# Patient Record
Sex: FEMALE
[De-identification: names, ages, dates, MRNs, and addresses within clinical notes are randomized per-mention and may not be internally consistent; named-entity substitution may affect disease eponyms.]

---

## 2020-03-31 ENCOUNTER — HOSPITAL ENCOUNTER (EMERGENCY)
Dept: HOSPITAL 97 - ER | Age: 55
Discharge: HOME | End: 2020-03-31
Payer: SELF-PAY

## 2020-03-31 VITALS — DIASTOLIC BLOOD PRESSURE: 69 MMHG | SYSTOLIC BLOOD PRESSURE: 111 MMHG | OXYGEN SATURATION: 100 %

## 2020-03-31 VITALS — TEMPERATURE: 97.7 F

## 2020-03-31 DIAGNOSIS — K21.9: ICD-10-CM

## 2020-03-31 DIAGNOSIS — N10: Primary | ICD-10-CM

## 2020-03-31 LAB
BUN BLD-MCNC: 11 MG/DL (ref 7–18)
GLUCOSE SERPLBLD-MCNC: 108 MG/DL (ref 74–106)
HCT VFR BLD CALC: 31.6 % (ref 36–45)
LYMPHOCYTES # SPEC AUTO: 1.8 K/UL (ref 0.7–4.9)
PMV BLD: 8.6 FL (ref 7.6–11.3)
POTASSIUM SERPL-SCNC: 3.9 MMOL/L (ref 3.5–5.1)
RBC # BLD: 3.68 M/UL (ref 3.86–4.86)
UA COMPLETE W REFLEX CULTURE PNL UR: (no result)

## 2020-03-31 PROCEDURE — 86308 HETEROPHILE ANTIBODY SCREEN: CPT

## 2020-03-31 PROCEDURE — 80048 BASIC METABOLIC PNL TOTAL CA: CPT

## 2020-03-31 PROCEDURE — 87086 URINE CULTURE/COLONY COUNT: CPT

## 2020-03-31 PROCEDURE — 85025 COMPLETE CBC W/AUTO DIFF WBC: CPT

## 2020-03-31 PROCEDURE — 87088 URINE BACTERIA CULTURE: CPT

## 2020-03-31 PROCEDURE — 99284 EMERGENCY DEPT VISIT MOD MDM: CPT

## 2020-03-31 PROCEDURE — 81015 MICROSCOPIC EXAM OF URINE: CPT

## 2020-03-31 PROCEDURE — 36415 COLL VENOUS BLD VENIPUNCTURE: CPT

## 2020-03-31 PROCEDURE — 87804 INFLUENZA ASSAY W/OPTIC: CPT

## 2020-03-31 PROCEDURE — 81003 URINALYSIS AUTO W/O SCOPE: CPT

## 2020-03-31 PROCEDURE — 96365 THER/PROPH/DIAG IV INF INIT: CPT

## 2020-03-31 PROCEDURE — 74177 CT ABD & PELVIS W/CONTRAST: CPT

## 2020-03-31 NOTE — EDPHYS
Physician Documentation                                                                           

 [facility]                                                                 

Name: Lilliana Coombs                                                                          

Age: 55 yrs                                                                                       

Sex: Female                                                                                       

: 1965                                                                                   

MRN: [id number]                                                                                   

Arrival Date: 2020                                                                          

Time: 06:15                                                                                       

Account#: [id number]                                                                            

Bed 17                                                                                            

Private MD:                                                                                       

ED Physician Dominguez Soriano                                                                     

HPI:                                                                                              

                                                                                             

06:56 This 55 yrs old  Female presents to ER via Ambulatory with complaints of Fever, kb  

      Body Aches.                                                                                 

06:56 The patient or guardian reports cough, that is intermittent, described as mild, flu     kb  

      symptoms, low-grade fever, myalgias, no appetite. Onset: The symptoms/episode               

      began/occurred 3 week(s) ago. Severity of symptoms: At their worst the symptoms were        

      mild, moderate, in the emergency department the symptoms are unchanged. Modifying           

      factors: The symptoms are alleviated by nothing, the symptoms are aggravated by             

      nothing. Associated signs and symptoms: Pertinent positives: fever, Pertinent               

      negatives: chest pain, diarrhea, ear ache, nausea, rhinorrhea, sore throat, vomiting.       

      The patient has not experienced similar symptoms in the past. The patient has been          

      recently seen by a physician:. Pt reports she has had body aches, decreased appetite,       

      fever, slight cough (not out of normal for her), suprapubic pain for 3 weeks. Completed     

      a course of bactrim at the beginning for UTI, but never got better. Was seen by Dr Esteban and tested negative for flu and COVID, but still not feeling well. .              

                                                                                                  

OB/GYN:                                                                                           

06:53 LMP N/A - Hysterectomy                                                                  mg2 

                                                                                                  

Historical:                                                                                       

- Allergies:                                                                                      

06:40 No Known Allergies;                                                                     mg2 

- Home Meds:                                                                                      

06:40 Nexium Oral [Active];                                                                   mg2 

- PMHx:                                                                                           

06:40 GERD; uterine ca;                                                                       mg2 

- PSHx:                                                                                           

06:40 Cholecystectomy; Hysterectomy;                                                          mg2 

                                                                                                  

- Immunization history:: Flu vaccine is up to date.                                               

- Social history:: Smoking status: Patient denies any tobacco usage or history of.                

  Patient/guardian denies using alcohol, street drugs, IV drugs.                                  

                                                                                                  

                                                                                                  

ROS:                                                                                              

06:53 ENT: Negative for injury, pain, and discharge, Neck: Negative for injury, pain, and     kb  

      swelling, Cardiovascular: Negative for chest pain, palpitations, and edema, Back:           

      Negative for injury and pain, MS/Extremity: Negative for injury and deformity, Skin:        

      Negative for injury, rash, and discoloration, Neuro: Negative for headache, weakness,       

      numbness, tingling, and seizure.                                                            

06:53 Constitutional: Positive for body aches, chills, fatigue, fever, malaise, Negative for      

      poor PO intake, weight loss.                                                                

06:53 Respiratory: Positive for cough, Negative for dyspnea on exertion, hemoptysis,              

      orthopnea, pleurisy, shortness of breath, sputum production, wheezing.                      

06:53 Abdomen/GI: Positive for abdominal pain, Negative for nausea, vomiting, and diarrhea,       

      constipation, abdominal cramps, abdominal distension, anorexia.                             

                                                                                                  

Exam:                                                                                             

06:55 Constitutional:  This is a well developed, well nourished patient who is awake, alert,  kb  

      and in no acute distress. Head/Face:  Normocephalic, atraumatic. ENT:  Nares patent. No     

      nasal discharge, no septal abnormalities noted.  Tympanic membranes are normal and          

      external auditory canals are clear.  Oropharynx with no redness, swelling, or masses,       

      exudates, or evidence of obstruction, uvula midline.  Mucous membranes moist. Neck:         

      Trachea midline, no thyromegaly or masses palpated, and no cervical lymphadenopathy.        

      Supple, full range of motion without nuchal rigidity, or vertebral point tenderness.        

      No Meningismus. Chest/axilla:  Normal chest wall appearance and motion.  Nontender with     

      no deformity.  No lesions are appreciated. Cardiovascular:  Regular rate and rhythm         

      with a normal S1 and S2.  No gallops, murmurs, or rubs.  Normal PMI, no JVD.  No pulse      

      deficits. Respiratory:  Lungs have equal breath sounds bilaterally, clear to                

      auscultation and percussion.  No rales, rhonchi or wheezes noted.  No increased work of     

      breathing, no retractions or nasal flaring. Abdomen/GI:  Soft, non-tender, with normal      

      bowel sounds.  No distension or tympany.  No guarding or rebound.  No evidence of           

      tenderness throughout. Back:  No spinal tenderness.  No costovertebral tenderness.          

      Full range of motion. Skin:  Warm, dry with normal turgor.  Normal color with no            

      rashes, no lesions, and no evidence of cellulitis. MS/ Extremity:  Pulses equal, no         

      cyanosis.  Neurovascular intact.  Full, normal range of motion. Neuro:  Awake and           

      alert, GCS 15, oriented to person, place, time, and situation.  Cranial nerves II-XII       

      grossly intact.  Motor strength 5/5 in all extremities.  Sensory grossly intact.            

      Cerebellar exam normal.  Normal gait.                                                       

                                                                                                  

Vital Signs:                                                                                      

06:36  / 79; Pulse 94; Resp 18; Temp 97.7; Pulse Ox 100% on R/A; Weight 68.04 kg;       mg2 

      Height 5 ft. 6 in. (167.64 cm); Pain 7/10;                                                  

07:35 BP 95 / 62; Pulse 64; Resp 17; Pulse Ox 96% on R/A;                                     rb1 

08:35  / 69; Pulse 73; Resp 16; Pulse Ox 100% on R/A;                                   rb1 

06:36 Body Mass Index 24.21 (68.04 kg, 167.64 cm)                                             mg2 

                                                                                                  

MDM:                                                                                              

06:19 Patient medically screened.                                                             kb  

06:53 Data reviewed: vital signs, nurses notes. Data interpreted: Pulse oximetry: on room air kb  

      is 100 %. Interpretation: normal.                                                           

08:16 Counseling: I had a detailed discussion with the patient and/or guardian regarding: the kb  

      historical points, exam findings, and any diagnostic results supporting the                 

      discharge/admit diagnosis, lab results, radiology results, the need for outpatient          

      follow up, a family practitioner, to return to the emergency department if symptoms         

      worsen or persist or if there are any questions or concerns that arise at home.             

                                                                                                  

                                                                                             

06:32 Order name: Flu; Complete Time: 07:33                                                   kb  

                                                                                             

06:32 Order name: Basic Metabolic Panel; Complete Time: 07:33                                 kb  

                                                                                             

06:32 Order name: CBC with Diff; Complete Time: 07:25                                         kb  

                                                                                             

06:32 Order name: Mono Screen Profile; Complete Time: 07:28                                   kb  

                                                                                             

06:32 Order name: Urine Microscopic Only; Complete Time: 07:41                                kb  

                                                                                             

06:33 Order name: Urine Dipstick--Ancillary (enter results)                                   mw2 

                                                                                             

06:32 Order name: IV Saline Lock; Complete Time: 06:50                                        kb  

                                                                                             

06:32 Order name: Labs collected and sent; Complete Time: 06:50                               kb  

                                                                                             

07:34 Order name: CT Abd/Pelvis - IV Contrast Only; Complete Time: 08:15                      kb  

                                                                                             

07:41 Order name: Urine Culture                                                               EDMS

                                                                                                  

Administered Medications:                                                                         

08:16 CANCELLED (Duplicate Order): Rocephin 1 grams IV at calculated rate once; Given slow IV kb  

      push per pharmacy instructions                                                              

08:22 Drug: Rocephin 1 grams Route: IV; Rate: calculated rate; Site: left antecubital;        rb1 

08:46 Follow up: Response: No adverse reaction; IV Status: Completed infusion                 rb1 

                                                                                                  

                                                                                                  

Disposition:                                                                                      

                                                                                             

05:01 Co-signature as Attending Physician, Dominguez Soriano MD I agree with the assessment and tw4 

      plan of care.                                                                               

                                                                                                  

Disposition:                                                                                      

20 08:17 Discharged to Home. Impression: Acute tubulo-interstitial nephritis.               

- Condition is Stable.                                                                            

- Discharge Instructions: Pyelonephritis, Adult, Easy-to-Read.                                    

- Prescriptions for cefpodoxime 200 mg Oral Tablet - take 1 tablet by ORAL route every            

  12 hours for 10 days with food; 20 tablet.                                                      

- Medication Reconciliation Form, Thank You Letter, Antibiotic Education, Prescription            

  Opioid Use form.                                                                                

- Follow up: Emergency Department; When: As needed; Reason: Worsening of condition.               

  Follow up: Private Physician; When: 2 - 3 days; Reason: Recheck today's complaints,             

  Continuance of care, Re-evaluation by your physician.                                           

                                                                                                  

                                                                                                  

                                                                                                  

Signatures:                                                                                       

Dispatcher [employer]                           EDAmanda Valentine, FNP-C                 FNP-CkTiffany Smith, RN                     RN   rb1                                                  

Dominguez Soriano MD MD   tw4                                                  

Lewis Saha RN                    RN   mg2                                                  

                                                                                                  

Corrections: (The following items were deleted from the chart)                                    

                                                                                             

08:16 08:16 Rocephin 1 grams IV at calculated rate once; Given slow IV push per pharmacy      kb  

      instructions ordered. kb                                                                    

08:56 08:17 2020 08:17 Discharged to Home. Impression: Acute tubulo-interstitial        rb1 

      nephritis. Condition is Stable. Forms are Medication Reconciliation Form, Thank You         

      Letter, Antibiotic Education, Prescription Opioid Use. Follow up: Emergency Department;     

      When: As needed; Reason: Worsening of condition. Follow up: Private Physician; When: 2      

      - 3 days; Reason: Recheck today's complaints, Continuance of care, Re-evaluation by         

      your physician. kb                                                                          

                                                                                                  

**************************************************************************************************

## 2020-03-31 NOTE — ER
Nurse's Notes                                                                                     

 [facility]                                                                 

Name: Lilliana Coombs                                                                          

Age: 55 yrs                                                                                       

Sex: Female                                                                                       

: 1965                                                                                   

MRN: [id number]                                                                                   

Arrival Date: 2020                                                                          

Time: 06:15                                                                                       

Account#: [id number]                                                                            

Bed 17                                                                                            

Private MD:                                                                                       

Diagnosis: Acute tubulo-interstitial nephritis                                                    

                                                                                                  

Presentation:                                                                                     

                                                                                             

06:34 Chief complaint: Patient states: i think i have kidney infection. i have abdominal      mg2 

      pain, loss of appetite, nausea for 2 weeks. i was tested for covid 19 last thurs and it     

      came out negative. i was already treated with bactrim but im not improving. i also have     

      fever yesterday 100.7.                                                                      

06:34 Method Of Arrival: Ambulatory                                                           mg2 

06:36 Coronavirus screen: Patient denies a cough. Patient denies shortness of breath or       mg2 

      difficulty breathing. Patient reports a measured and/or subjective temperature greater      

      than 100.4F. Patient denies travel on a cruise ship or to a country the [facility] currently       

      lists as an affected area. Ebola Screen: No symptoms or risks identified at this time.      

      Initial Sepsis Screen: Does the patient meet any 2 criteria? No. Patient's initial          

      sepsis screen is negative. Does the patient have a suspected source of infection? No.       

      Patient's initial sepsis screen is negative. Risk Assessment: Do you want to hurt           

      yourself or someone else? Patient reports no desire to harm self or others.                 

06:36 Acuity: LAINE 3                                                                           mg2 

06:52 Onset of symptoms was 2020.                                                       mg2 

                                                                                                  

OB/GYN:                                                                                           

06:53 LMP N/A - Hysterectomy                                                                  mg2 

                                                                                                  

Historical:                                                                                       

- Allergies:                                                                                      

06:40 No Known Allergies;                                                                     mg2 

- Home Meds:                                                                                      

06:40 Nexium Oral [Active];                                                                   mg2 

- PMHx:                                                                                           

06:40 GERD; uterine ca;                                                                       mg2 

- PSHx:                                                                                           

06:40 Cholecystectomy; Hysterectomy;                                                          mg2 

                                                                                                  

- Immunization history:: Flu vaccine is up to date.                                               

- Social history:: Smoking status: Patient denies any tobacco usage or history of.                

  Patient/guardian denies using alcohol, street drugs, IV drugs.                                  

                                                                                                  

                                                                                                  

Screenin:52 Abuse screen: Denies threats or abuse. Denies injuries from another. Nutritional        mg2 

      screening: No deficits noted. Tuberculosis screening: No symptoms or risk factors           

      identified. Fall Risk IV access (20 points).                                                

                                                                                                  

Assessment:                                                                                       

06:50 General: Appears in no apparent distress. comfortable, Behavior is calm, appropriate    mg2 

      for age. Pain: Complains of pain in abdomen Pain currently is 7 out of 10 on a pain         

      scale. Quality of pain is described as aching, Pain began gradually, 2 weeks now.           

      Neuro: Level of Consciousness is awake, alert, obeys commands, Oriented to person,          

      place, time, situation. Cardiovascular: Capillary refill < 3 seconds Patient's skin is      

      warm and dry. Respiratory: Airway is patent Respiratory effort is even, unlabored,          

      Respiratory pattern is regular, symmetrical. GI: Reports lower abdominal pain, nausea,      

      loss of appetite. : No signs and/or symptoms were reported regarding the                  

      genitourinary system. EENT: No signs and/or symptoms were reported regarding the EENT       

      system. Derm: Skin is intact, is healthy with good turgor, Skin is pink, warm \T\ dry.      

      normal. Musculoskeletal: Circulation, motion, and sensation intact. Capillary refill <      

      3 seconds.                                                                                  

07:15 General: Appears in no apparent distress. comfortable, Behavior is calm, cooperative.   rb1 

      Pain: Complains of pain in abdomen Pain currently is 7 out of 10 on a pain scale.           

      Neuro: Level of Consciousness is awake, alert, obeys commands, Oriented to person,          

      place, time, situation. Cardiovascular: Capillary refill < 3 seconds is brisk in            

      bilateral fingers. Respiratory: Airway is patent Respiratory effort is even, unlabored,     

      Respiratory pattern is regular, symmetrical. Derm: Skin is pink, warm \T\ dry.              

08:15 Reassessment: Patient appears in no apparent distress at this time. Pt. is on her       rb1 

      telephone.                                                                                  

08:33 Reassessment: Discharge pending due to shot time.                                       rb1 

08:54 Reassessment: Patient appears in no apparent distress at this time. Patient and/or      rb1 

      family updated on plan of care and expected duration. Pain level reassessed. Patient is     

      alert, oriented x 3, equal unlabored respirations, skin warm/dry/pink. no adverse           

      reaction noted at this time.                                                                

                                                                                                  

Vital Signs:                                                                                      

06:36  / 79; Pulse 94; Resp 18; Temp 97.7; Pulse Ox 100% on R/A; Weight 68.04 kg;       mg2 

      Height 5 ft. 6 in. (167.64 cm); Pain 7/10;                                                  

07:35 BP 95 / 62; Pulse 64; Resp 17; Pulse Ox 96% on R/A;                                     rb1 

08:35  / 69; Pulse 73; Resp 16; Pulse Ox 100% on R/A;                                   rb1 

06:36 Body Mass Index 24.21 (68.04 kg, 167.64 cm)                                             mg2 

                                                                                                  

ED Course:                                                                                        

06:15 Patient arrived in ED.                                                                  ds1 

06:17 [facility]                                                        kb  

06:17 Dominguez Soriano MD is Attending Physician.                                            kb  

06:19 Lewis Saha, RN is Primary Nurse.                                                  mg2 

06:38 Triage completed.                                                                       mg2 

06:38 Arm band placed on.                                                                     mg2 

06:52 Patient has correct armband on for positive identification. Pulse ox on. NIBP on. Door  mg2 

      closed. Warm blanket given.                                                                 

06:52 No provider procedures requiring assistance completed. Inserted saline lock: 20 gauge   mg2 

      in left antecubital area, using aseptic technique. Blood collected.                         

07:54 CT Abd/Pelvis - IV Contrast Only In Process Unspecified.                                EDMS

08:55 IV discontinued, intact, bleeding controlled, No redness/swelling at site. Pressure     rb1 

      dressing applied.                                                                           

                                                                                                  

Administered Medications:                                                                         

08:16 CANCELLED (Duplicate Order): Rocephin 1 grams IV at calculated rate once; Given slow IV kb  

      push per pharmacy instructions                                                              

08:22 Drug: Rocephin 1 grams Route: IV; Rate: calculated rate; Site: left antecubital;        rb1 

08:46 Follow up: Response: No adverse reaction; IV Status: Completed infusion                 rb1 

                                                                                                  

                                                                                                  

Outcome:                                                                                          

08:17 Discharge ordered by MD.                                                                kb  

08:55 Discharged to home ambulatory.                                                          rb1 

08:55 Condition: stable                                                                           

08:55 Discharge instructions given to patient, Instructed on discharge instructions, follow       

      up and referral plans. medication usage, Demonstrated understanding of instructions,        

      follow-up care, medications, Prescriptions given X 1.                                       

08:56 Patient left the ED.                                                                    rb1 

                                                                                                  

Signatures:                                                                                       

Dispatcher [employer]                           EDMS                                                 

Amanda Beltrán FNP-C                 FNTHOMAS-Candie Benson                                ds1                                                  

Tiffany Crum, RN                     RN   rb1                                                  

Lewis Saha, SAE                    RN   mg2                                                  

                                                                                                  

Corrections: (The following items were deleted from the chart)                                    

06:39 06:34 Chief complaint: Patient states: i think i have kidney infection. i have          mg2 

      abdominal pain, loss of appetite, nausea for 2 weeks. i was tested for covid 19 last        

      thurs and it came out negative. i was already treated with bactrim but im not               

      improving. mg2                                                                              

06:39 06:36 Coronavirus screen: Patient denies a cough. Patient denies shortness of breath or mg2 

      difficulty breathing. Patient reports a measured and/or subjective temperature greater      

      than 100.4F. Patient denies travel on a cruise ship or to a country the [facility] currently       

      lists as an affected area. mg2                                                              

                                                                                                  

**************************************************************************************************

## 2020-03-31 NOTE — XMS REPORT
[facility]

 Created on:2020



Patient:Lilliana Coombs

Sex:Female

:1965

External Reference #:[id number]





Demographics







 Address  [address] [place] [address]

 

 Phone  [phone]

 

 Preferred Language  en

 

 Marital Status  Unknown

 

 Yarsanism Affiliation  Unknown

 

 Race  White

 

 Ethnic Group  Unknown









Author







 Organization  [employer]









Care Team Providers







 Name  Role  Phone

 

 Aspen Jurado  Provider Role  Unavailable









Allergies, Adverse Reactions, Alerts







 Substance  Reaction  Event Type

 

 Garlic  Info Not Available  Drug Allergy







Problems







 Problem Type  Condition  Code  Onset Dates  Condition Status

 

 Problem  Gastroesophageal reflux disease,  K21.9    Active



   esophagitis presence not specified      

 

 Problem  Diverticulosis  K57.90    Active

 

 Problem  Prediabetes  R73.03    Active

 

 Assessment  Viral upper respiratory illness  J06.9    Active

 

 Assessment  Cough  R05    Active

 

 Problem  Gastroparesis  K31.84    Active

 

 Problem  Gallstones  K80.20    Active

 

 Problem  Hypercholesterolemia  E78.00    Active

 

 Problem  Reflux  K21.9    Active

 

 Problem  Abdominal cramping  R10.9    Active

 

 Problem  Acute gastroenteritis  K52.9    Active

 

 Problem  Irritable bowel  K58.9    Active

 

 Problem  Cancer  C80.1    Active







Medications







 Medication  Code  Code  Instructions  Start  End Date  Status  Dosage



   System      Date      

 

 Bactrim DS  NDC  45345323267  800-160 MG  ,    Active  1 tablet



       Orally Twice a  2020      



       day for 10 days        

 

 Nexium  NDC  [address]-56   Orally as      Active  1 capsule



       directed prn        







Results

No Known Results



Summary Purpose

[employer] Submission

## 2020-03-31 NOTE — XMS REPORT
Patient Summary Document

 Created on:2020



Patient:PROSPER SAUNDERS

Sex:Female

:1965

External Reference #:[id number]





Demographics







 Address  [address] [address]

 

 Home Phone  [phone]

 

 Work Phone  [phone]

 

 Email Address  [email or web address]

 

 Preferred Language  Unknown

 

 Marital Status  Unknown

 

 Shinto Affiliation  Unknown

 

 Race  Unknown

 

 Additional Race(s)  Unavailable

 

 Ethnic Group  Unknown









Author







 Organization  [facility]

 

 Address  [address] [place] [address] [address]. [place] [address] [place] [address]

 

 Phone  [phone]









Care Team Providers







 Name  Role  Phone

 

 Unavailable  Unavailable  Unavailable









Problems

This patient has no known problems.



Allergies, Adverse Reactions, Alerts

This patient has no known allergies or adverse reactions.



Medications

This patient has no known medications.

## 2020-03-31 NOTE — XMS REPORT
[facility]

 Created on:2019



Patient:Lilliana Coombs

Sex:Female

:1965

External Reference #:[id number]





Demographics







 Address  [address] [place] [address]

 

 Phone  [phone]

 

 Preferred Language  en

 

 Marital Status  Unknown

 

 Voodoo Affiliation  Unknown

 

 Race  White

 

 Ethnic Group  Unknown









Author







 Organization  [employer]









Care Team Providers







 Name  Role  Phone

 

 Debi Esteban  Provider Role  Unavailable









Allergies, Adverse Reactions, Alerts







 Substance  Reaction  Event Type

 

 Garlic  Info Not Available  Drug Allergy







Problems







 Problem Type  Condition  Code  Onset Dates  Condition Status

 

 Problem  Gastroesophageal reflux disease,  K21.9    Active



   esophagitis presence not specified      

 

 Problem  Diverticulosis  K57.90    Active

 

 Problem  Prediabetes  R73.03    Active

 

 Assessment  Abdominal cramping  R10.9    Active

 

 Assessment  Acute gastroenteritis  K52.9    Active

 

 Problem  Gastroparesis  K31.84    Active

 

 Problem  Gallstones  K80.20    Active

 

 Problem  Hypercholesterolemia  E78.00    Active

 

 Problem  Reflux  K21.9    Active

 

 Problem  Abdominal cramping  R10.9    Active

 

 Problem  Acute gastroenteritis  K52.9    Active

 

 Problem  Irritable bowel  K58.9    Active

 

 Problem  Cancer  C80.1    Active







Medications







 Medication  Code  Code  Instructions  Start  End  Status  Dosage



   System      Date  Date    

 

 Dicyclomine HCl  NDC  93715954935  20 MG Orally  Aug 30,  Sept  Active  1 
tablet



       Four times a    09,    



       day as needed        



       for stomach        



       cramping/pain        

 

 Nexium  NDC  [address]-84   Orally as      Active  1 capsule



       directed prn        







Results

No Known Results



Summary Purpose

[employer] Submission

## 2020-03-31 NOTE — XMS REPORT
[facility]

 Created on:2020



Patient:Lilliana Coombs

Sex:Female

:1965

External Reference #:[id number]





Demographics







 Address  [address] [place] [address]

 

 Phone  [phone]

 

 Preferred Language  en

 

 Marital Status  Unknown

 

 Church Affiliation  Unknown

 

 Race  White

 

 Ethnic Group  Unknown









Author







 Organization  [employer]









Care Team Providers







 Name  Role  Phone

 

 Debi Esteban  Provider Role  Unavailable









Allergies

No Known Allergies



Problems







 Problem Type  Condition  Code  Onset Dates  Condition Status

 

 Problem  Gastroesophageal reflux disease,  K21.9    Active



   esophagitis presence not specified      

 

 Problem  Diverticulosis  K57.90    Active

 

 Problem  Prediabetes  R73.03    Active

 

 Problem  Gastroparesis  K31.84    Active

 

 Problem  Gallstones  K80.20    Active

 

 Problem  Hypercholesterolemia  E78.00    Active

 

 Problem  Reflux  K21.9    Active

 

 Problem  Abdominal cramping  R10.9    Active

 

 Problem  Acute gastroenteritis  K52.9    Active

 

 Problem  Irritable bowel  K58.9    Active

 

 Problem  Cancer  C80.1    Active







Medications

No Known Medications



Results

No Known Results



Summary Purpose

[employer] Submission

## 2020-03-31 NOTE — XMS REPORT
Continuity of Care Document (C-CDA R2.1) (Encounter date: 2020 03:00 PM)

 Created on:2020



Patient:Corin

Sex:Female

:1965

External Reference #:[id number]





Demographics







 Address  [address] [place] [address]

 

 Mobile Phone  [phone]

 

 Home Phone  [phone]

 

 Preferred Language  en

 

 Marital Status  Unknown

 

 Restorationism Affiliation  Unknown

 

 Race  White

 

 Ethnic Group   or 









Author







 Organization  [facility]

 

 Address  [address]



   [place] [address]

 

 Phone  [phone]









Support







 Name  Relationship  Address  Phone

 

 Kamaljit Miller  Unavailable  Unavailable  [phone]

 

 ......, ........  Unavailable  Unavailable  Unavailable









Care Team Providers







 Name  Role  Phone

 

 Anne Marie Hess  Unavailable  Unavailable









Allergies, Adverse Reactions, Alerts







 Substance  Reaction  Status

 

 No Known Allergies    Active







Medications







 Medication  Instructions  Dosage  Effective Dates  Status  Comments



       (start - stop)    

 

 mupirocin 2 %  apply by topical  0.00   -  Active  [Pat Resp=20



 topical ointment  route 3 times every        pct;]



   day a small amount        



   to the affected area        







Problems







 Condition  Effective Dates (start - stop)  Clinical Status  Comments









 No information







Procedures







 Procedure  Date

 

 Established Patient Office Visit-Level Four  







Results







 Test Name  Date and Time  Measure  Units  Reference Range  Abnormal Flag  
Status  Comments









 No information







Advance Directives







 Directive  Yes / No  Effective Date  File Name









 No information







Encounters







 Encounter  Practice  Location  Reason(s)  Diagnoses  Date  Provider  Providers



 Description      For Visit        Copied on



               Encounter

 

 Established  [facility]  TC  Ear  Body mass index  -  Paolo  Referring



 Patient  [facility]  [facility]  complaints  (BMI) 26.0-26.9,    Anne Marie.  
Provider:



 Office  Wellness,  Health &  (chief  adultOnychomycosisOt  0  [address]  Anne Marie



 Visit-Level  [address]  complaint)T  algiaEncounter for    Anuj Boone,



 Four  [address]    oe fungus  immunizationRash    [place]  [address]



   [place],    (Anuj Nova



   [place],    complaint)R      [address]



   [id number]    shruti (chief      [facility]    complaint)      [place], [place],  [address],



   tel:+          [phone]  [place]



   [id number]          .  [place], [place],



             tel:+  [phone].



             [id number]  tel:[phone]



               [phone]

 

   [place]  TC    Acute upper  Feb-2    



   Health &  [place]    respiratory  [address]    



   Wellness,  Health &    infection,  8    



   [address]    unspecified      



   [address],            



   [phone]            



   ,             



   tel:[phone]            



   [phone]            







Family History







 Family Member  Diagnosis  Age At Onset

 

   Family history of Cancer, prostate  

 

   Family history of Cancer, kidney  







Immunizations







 Vaccine  Date  Status  Comments

 

 Zoster recombinant subunit,    pending  Source: New Immunization Record



 preservative free      







Payers







 Payer name  Insurance type  Covered party ID  Authorization(s)









 No information







Social History







 Type  Description  Quantity  Date Captured  Comments

 

 Alcohol Use Details  beer & liquor  2 beers occasionally    

 

 Caffeine Use  coffee  2 cups per day    



 Details        

 

 Tobacco Use Status  Heavy cigarette      



   smoker (20-39      



   cigs/day)      

 

 Smoking Status  Heavy tobacco smoker      

 

 Smoking Tobacco Use  Cigarette: No Details Available  Cigarette: 1 Packs per 
day    



 Details        

 

 Birth Sex  Female      







Vital Signs







 Date /  Height  Weight  BMI  Pulse  Blood  Temperature  Respiratory  Body  
Head  BMI  Pulse  Inhaled



 Time:        Rate  Pressure    Rate  Surface  Circumference  percentile  Ox  Ox



                 Area        

 

   66.00  164.00  26.4  81  117/76  97.1 F  16 /min  1.86        in  lbs  7  /min  mm[Hg]      meter(2)        



 3:31      kg/m                  



 PM      eter                  



       (2)                  







Chief Complaint And Reason For Visit

*** Most recent encounter only, dated '2020 15:00'. ***Ear complaints (
chief complaint). Description: The symptoms began 1 day ago. The symptoms are 
reported as being moderate. She states the symptoms are acute. Pt states that 
in the car today she experienced a shooting pain starting from her R tragus to 
about an inch anterior to that. Pain is associated with swallowing. She denies 
any ear popping. She denies any prior episodes, but states that she clenches 
her teeth at night.Toe fungus (chief complaint). Description: The symptoms 
began 2 years ago. The symptoms are reportedas being moderate. The symptoms 
occur constantly. The location is bilateral feet, worse on R. She states the 
symptoms are chronic. Pt states she has had onychomycosis x 2 years. She has 
tried a prescription lotion, kerasal, and bleach water, and an additional 
prescription she got from her sister (cannot recall the name). She endorses 
some swelling over the great toe and intermittent blistering just proximal to 
the nail. She has been given a 5 day course of abx in the past for an infected 
blister. Another physician was going to start her on an oral medication but she 
has not obtained the LFTs yet.Rash (chief complaint). Description: The patient 
presents for Rash. The symptom(s) are described as moderate and occurs 
occasionally. Affected area(s) include left thigh. The symptoms are associated 
with stress. Associated symptoms include painful rash. Additional information: 
wants vaccinePt states she has had intermittent episodes of shingles, the first 
being 7 years ago. Outbreaks are associated with stress. The rash appears over 
the posterior aspect of her L thigh. The current outbreak began 5 days ago and 
is now scabbing over. She reports associated pain.



Reason For Referral







 Reason For Referral

 

 No information







Plan Of Treatment







 Date  Type  Action  Status

 

   Goal  Lifestyle education regarding diet  completed

 

   Referral   Ordered:  ordered



     Podiatry (related to Onychomycosis)  

 

   Referral   Ordered:  ordered



     Referrals: Podiatry. Evaluate and treat  

 

   Appointment  Lilliana Coombs  BOOKED

 

   Appointment  Lilliana Coombs  BOOKED

 

 Unknown  Immunization  Zoster recombinant subunit, preservative free  ordered







History Of Present Illness







 Encounter Date  Complaint  History Of Present Illness

 

   Toe fungus  The symptoms began 2 years ago. The symptoms are



     reported as being moderate. The symptoms occur



     constantly. The location is bilateral feet, worse on R.



     She states the symptoms are chronic. Pt states she has



     had onychomycosis x 2 years. She has tried a



     prescription lotion, kerasal, and bleach water, and an



     additional prescription she got from her sister (cannot



     recall the name). She endorses some swelling over the



     great toe and intermittent blistering just proximal to



     the nail. She has been given a 5 day course of abx in



     the past for an infected blister. Another physician was



     going to start her on an oral medication but she has



     not obtained the LFTs yet.

 

   Ear complaints  The symptoms began 1 day ago. The symptoms are 
reported



     as being moderate. She states the symptoms are acute.



     Pt states that in the car today she experienced a



     shooting pain starting from her R tragus to about an



     inch anterior to that. Pain is associated with



     swallowing. She denies any ear popping. She denies any



     prior episodes, but states that she clenches her teeth



     at night.

 

   Rash  The patient presents for Rash. The symptom(s) are



     described as moderate and occurs occasionally. Affected



     area(s) include left thigh. The symptoms are associated



     with stress. Associated symptoms include painful rash.



     Additional information: wants vaccinePt states she has



     had intermittent episodes of shingles, the first being



     7 years ago. Outbreaks are associated with stress. The



     rash appears over the posterior aspect of her L thigh.



     The current outbreak began 5 days ago and is now



     scabbing over. She reports associated pain.







Functional Status







 Date  Functional Assessment









 No information







Medications Administered







 Medication  Instructions  Dosage  Effective Dates (start - stop)  Status  
Comments









 No information







Instructions







 Date  Instruction  Additional Information

 

   Keep area clean and dry Rx for topical  Related to Rash



   mupirocin ointment prn  

 

   Shingrix ordered  Related to Encounter for



     immunization

 

   pt instructed to take OTC  Related to Otalgia



   antiinflammatories and RTC if the pain  



   worsens  

 

   compliant with LSMpt instructed to  Related to Body mass index (
BMI)



   reschedule WWE to update pap smear  26.0-26.9, adult

 

   pt referred to podiatry for management  Related to Onychomycosis



   of longstanding onychomycosisRx for  



   topical mupirocin ointment to prevent  



   secondary infection  

 

   Giving encouragement to exercise  Related to Body mass index (BMI)



     26.0-26.9, adult

 

   Lifestyle education regarding diet  Related to Body mass index (
BMI)



     26.0-26.9, adult

 

 Mar-  Pt instructed to make appt for  Related to Acute upper



   WWEDiscussed safe otc options for  respiratory infection,



   medication, caution with  unspecified



   sleepinessDiscussed w pt risks w  



   unnecessary abx, need to avoid  







Assessments







 Type  Assessment  Date

 

 assessment  Body mass index (BMI) 26.0-26.9, adult  

 

 assessment  Onychomycosis  

 

 impression  pt has tried many different topical medications without  2020



   success  

 

 assessment  Otalgia  

 

 assessment  Encounter for immunization  

 

 assessment  Rash  

 

 impression  PE unremarkable  

 

 impression  pt has healing rash on posterior leg she believes to be  2020



   shingles; PE does not reveal usual presentation for shingles  







Goals







 Health Concern  Goal  Type  Priority  Status  Date









 No information







Medical Equipment







 Description  Device  Universal Device Identifier  Effective Dates (start - stop
)  Status









 No information







Mental Status







 Date  Cognitive Assessment

 

   Orientation - Oriented to time, place, person, situation.







Health Concerns







 Observation  Date









 No information









 Concern  Status  Date









 No information

## 2020-03-31 NOTE — XMS REPORT
Continuity of Care Document (C-CDA R2.1) (Encounter date: 2020 03:52 PM)

 Created on:2020



Patient:Corin

Sex:Female

:1965

External Reference #:[id number]





Demographics







 Address  [address] [place] [address]

 

 Mobile Phone  [phone]

 

 Home Phone  [phone]

 

 Preferred Language  en

 

 Marital Status  Unknown

 

 Evangelical Affiliation  Unknown

 

 Race  White

 

 Ethnic Group   or 









Author







 Organization  [facility]

 

 Address  [address]



   [place] [address]

 

 Phone  [phone]









Support







 Name  Relationship  Address  Phone

 

 Kamaljit Miller  Unavailable  Unavailable  [phone]

 

 Anne Marie Boone  Unavailable  Unavailable  Unavailable









Care Team Providers







 Name  Role  Phone

 

 Anne Marie Hess  Unavailable  Unavailable









Allergies, Adverse Reactions, Alerts







 Substance  Reaction  Status

 

 No Known Allergies    Active







Medications







 Medication  Instructions  Dosage  Effective Dates  Status  Comments



       (start - stop)    

 

 mupirocin 2 %  apply by topical  0.00   -  Active  [Pat Resp=20



 topical ointment  route 3 times every        pct;]



   day a small amount        



   to the affected area        







Problems







 Condition  Effective Dates (start - stop)  Clinical Status  Comments









 No information







Procedures







 Procedure  Date









 No information







Results







 Test Name  Date and Time  Measure  Units  Reference Range  Abnormal Flag  
Status  Comments









 No information







Advance Directives







 Directive  Yes / No  Effective Date  File Name









 No information







Encounters







 Encounter  Practice  Location  Reason(s)  Diagnoses  Date  Provider  Providers



 Description      For Visit        Copied on



               Encounter

 

   [facility]      Mar-  Paolo  Referring



   Health &  [facility]        Anne Marie.  Provider:



   Wellness,  Health &      [address]  Anne Marie



   [address]        Jeremy Rizzo, [place]  [address]



   [place]



   [place],          [facility] C,  [place]



   [id number]          [facility] C,



   tel:+          [phone]  [place]



   [id number]          .  [place], [place],



             tel:+  [phone].



             [phone]  tel:[phone]



               [phone]

 

   [facility]    Body mass index (BMI)    Paolo  Referring



   [facility]  [facility]    26.0-26.9,    Anne Marie.  Provider:



   Wellness,  Health &    adultOnychomycosisOta  0  [address]  Anne Marie



   [address]    lgiaEncounter for    Anuj Boone



   [address] [facility]    [place]  [address]



   [place]



   [place],          [address],  [place]



   [id number]          [facility]



   , [place], [place],  [address],



   tel:+          [phone]  [place]



   [id number]          .  [place], [place],



             tel:+  [phone].



             [phone]  tel:[phone]



               [phone]

 

   [facility]  TC    Acute upper  Feb-2    



   Health &  [place]    respiratory  [address]    



   Wellness,  Health &    infection,  [address]    



   [address]    unspecified      



   [address], [place], [place],            



   [phone]            



   ,             



   tel:+            



   [phone]            







Family History







 Family Member  Diagnosis  Age At Onset

 

   Family history of Cancer, prostate  

 

   Family history of Cancer, kidney  







Immunizations







 Vaccine  Date  Status  Comments

 

 Zoster recombinant subunit,    pending  Source: New Immunization Record



 preservative free      







Payers







 Payer name  Insurance type  Covered party ID  Authorization(s)









 No information







Social History







 Type  Description  Quantity  Date Captured  Comments

 

 Birth Sex  Female      







Vital Signs







 Date /  Height  Weight  BMI  Pulse  Blood  Temperature  Respiratory  Body  
Head  BMI  Pulse  Inhaled



 Time:        Rate  Pressure    Rate  Surface  Circumference  percentile  Ox  Ox



                 Area        









 No information







Chief Complaint And Reason For Visit

No information



Reason For Referral







 Reason For Referral

 

 No information







Plan Of Treatment







 Date  Type  Action  Status

 

   Goal  Lifestyle education regarding diet  completed

 

   Referral   Ordered:  ordered



     Referrals: Podiatry. Evaluate and treat  

 

 Unknown  Immunization  Zoster recombinant subunit, preservative free  ordered







History Of Present Illness







 Encounter Date  Complaint  History Of Present Illness









 No information







Functional Status







 Date  Functional Assessment









 No information







Medications Administered







 Medication  Instructions  Dosage  Effective Dates (start - stop)  Status  
Comments









 No information







Instructions







 Date  Instruction  Additional Information

 

   Keep area clean and dry Rx for topical  Related to Rash



   mupirocin ointment prn  

 

   Shingrix ordered  Related to Encounter for



     immunization

 

   pt instructed to take OTC  Related to Otalgia



   antiinflammatories and RTC if the pain  



   worsens  

 

   compliant with LSMpt instructed to  Related to Body mass index (
BMI)



   reschedule WWE to update pap smear  26.0-26.9, adult

 

   pt referred to podiatry for management  Related to Onychomycosis



   of longstanding onychomycosisRx for  



   topical mupirocin ointment to prevent  



   secondary infection  

 

   Giving encouragement to exercise  Related to Body mass index (BMI)



     26.0-26.9, adult

 

   Lifestyle education regarding diet  Related to Body mass index (
BMI)



     26.0-26.9, adult

 

 Mar-  Pt instructed to make appt for  Related to Acute upper



   WWEDiscussed safe otc options for  respiratory infection,



   medication, caution with  unspecified



   sleepinessDiscussed w pt risks w  



   unnecessary abx, need to avoid  







Assessments







 Type  Assessment  Date









 No information







Goals







 Health Concern  Goal  Type  Priority  Status  Date









 No information







Medical Equipment







 Description  Device  Universal Device Identifier  Effective Dates (start - stop
)  Status









 No information







Mental Status







 Date  Cognitive Assessment









 No information







Health Concerns







 Observation  Date









 No information









 Concern  Status  Date









 No information

## 2020-03-31 NOTE — XMS REPORT
[facility]

 Created on:2020



Patient:Lilliana Coombs

Sex:Female

:1965

External Reference #:[id number]





Demographics







 Address  [address] [place] [address]

 

 Phone  [phone]

 

 Preferred Language  en

 

 Marital Status  Unknown

 

 Pentecostalism Affiliation  Unknown

 

 Race  White

 

 Ethnic Group  Unknown









Author







 Organization  [employer]









Care Team Providers







 Name  Role  Phone

 

 Debi Esteban  Provider Role  Unavailable









Allergies

No Known Allergies



Problems







 Problem Type  Condition  Code  Onset Dates  Condition Status

 

 Problem  Gastroesophageal reflux disease,  K21.9    Active



   esophagitis presence not specified      

 

 Problem  Diverticulosis  K57.90    Active

 

 Problem  Prediabetes  R73.03    Active

 

 Problem  Gastroparesis  K31.84    Active

 

 Problem  Gallstones  K80.20    Active

 

 Problem  Hypercholesterolemia  E78.00    Active

 

 Problem  Reflux  K21.9    Active

 

 Problem  Abdominal cramping  R10.9    Active

 

 Problem  Acute gastroenteritis  K52.9    Active

 

 Problem  Irritable bowel  K58.9    Active

 

 Problem  Cancer  C80.1    Active







Medications

No Known Medications



Results

No Known Results



Summary Purpose

[employer] Submission

## 2020-03-31 NOTE — XMS REPORT
[facility]

 Created on:2020



Patient:Lilliana Coombs

Sex:Female

:1965

External Reference #:[id number]





Demographics







 Address  [address] [place] [address]

 

 Phone  [phone]

 

 Preferred Language  en

 

 Marital Status  Unknown

 

 Worship Affiliation  Unknown

 

 Race  White

 

 Ethnic Group  Unknown









Author







 Organization  [employer]









Care Team Providers







 Name  Role  Phone

 

 Debi Esteban  Provider Role  Unavailable









Allergies

No Known Allergies



Problems







 Problem Type  Condition  Code  Onset Dates  Condition Status

 

 Problem  Gastroesophageal reflux disease,  K21.9    Active



   esophagitis presence not specified      

 

 Problem  Diverticulosis  K57.90    Active

 

 Problem  Prediabetes  R73.03    Active

 

 Problem  Gastroparesis  K31.84    Active

 

 Problem  Gallstones  K80.20    Active

 

 Problem  Hypercholesterolemia  E78.00    Active

 

 Problem  Reflux  K21.9    Active

 

 Problem  Abdominal cramping  R10.9    Active

 

 Problem  Acute gastroenteritis  K52.9    Active

 

 Problem  Irritable bowel  K58.9    Active

 

 Problem  Cancer  C80.1    Active







Medications

No Known Medications



Results

No Known Results



Summary Purpose

[employer] Submission

## 2020-03-31 NOTE — RAD REPORT
EXAM DESCRIPTION:  CT - Abdomen   Pelvis W Contrast - 3/31/2020 7:53 am

 

CLINICAL HISTORY:  Abdominal pain

 

COMPARISON:  none.

 

TECHNIQUE:  Computed axial tomography of the abdomen pelvis was obtained. 100 cc Isovue-300 was admin
istered intravenously. Oral contrast was not requested which limits evaluation of bowel.

 

All CT scans are performed using dose optimization technique as appropriate and may include automated
 exposure control or mA/KV adjustment according to patient size.

 

FINDINGS:  The liver, spleen, pancreas, adrenal and right kidney appear unremarkable.

 

4 centimeter low to intermediate density area present within the upper pole left kidney reaching the 
periphery. This likely represents pyelonephritis.

 

Cholecystectomy

 

Hysterectomy

 

There is no evidence of diverticulitis.

 

Small hiatal hernia

 

IMPRESSION:  Mild to moderate left pyelonephritis

## 2023-07-03 ENCOUNTER — HOSPITAL ENCOUNTER (EMERGENCY)
Dept: HOSPITAL 9 - MADERS | Age: 58
LOS: 1 days | Discharge: ANOTHER HOSPITAL | End: 2023-07-04
Payer: COMMERCIAL

## 2023-07-03 DIAGNOSIS — Z87.891: ICD-10-CM

## 2023-07-03 DIAGNOSIS — K56.699: Primary | ICD-10-CM

## 2023-07-03 LAB
ALBUMIN SERPL BCG-MCNC: 3.8 G/DL (ref 3.5–5)
ALP SERPL-CCNC: 76 U/L (ref 40–110)
ALT SERPL W P-5'-P-CCNC: 110 U/L (ref 8–55)
ANION GAP SERPL CALC-SCNC: 14 MMOL/L (ref 10–20)
AST SERPL-CCNC: 67 U/L (ref 5–34)
BACTERIA UR QL AUTO: (no result) HPF
BASOPHILS # BLD AUTO: 0.1 THOU/UL (ref 0–0.2)
BASOPHILS NFR BLD AUTO: 1.4 % (ref 0–1)
BILIRUB SERPL-MCNC: 0.3 MG/DL (ref 0.2–1.2)
BUN SERPL-MCNC: 6 MG/DL (ref 9.8–20.1)
CALCIUM SERPL-MCNC: 9.3 MG/DL (ref 7.8–10.44)
CAUTI INDICATIONS FOR CULTURE: (no result)
CHLORIDE SERPL-SCNC: 102 MMOL/L (ref 98–107)
CO2 SERPL-SCNC: 28 MMOL/L (ref 22–29)
CREAT CL PREDICTED SERPL C-G-VRATE: 0 ML/MIN (ref 70–130)
EOSINOPHIL # BLD AUTO: 0.1 THOU/UL (ref 0–0.7)
EOSINOPHIL NFR BLD AUTO: 1.5 % (ref 0–10)
GLOBULIN SER CALC-MCNC: 3.2 G/DL (ref 2.4–3.5)
GLUCOSE SERPL-MCNC: 96 MG/DL (ref 70–105)
HGB BLD-MCNC: 11.3 G/DL (ref 12–16)
LIPASE SERPL-CCNC: 7 U/L (ref 8–78)
LYMPHOCYTES # BLD AUTO: 1.6 THOU/UL (ref 1.2–3.4)
LYMPHOCYTES NFR BLD AUTO: 16.4 % (ref 21–51)
MCH RBC QN AUTO: 29.7 PG (ref 27–31)
MCV RBC AUTO: 92.2 FL (ref 78–98)
MONOCYTES # BLD AUTO: 1.1 THOU/UL (ref 0.11–0.59)
MONOCYTES NFR BLD AUTO: 11 % (ref 0–10)
NEUTROPHILS # BLD AUTO: 6.9 THOU/UL (ref 1.4–6.5)
NEUTROPHILS NFR BLD AUTO: 69.8 % (ref 42–75)
PLATELET # BLD AUTO: 332 10X3/UL (ref 130–400)
POTASSIUM SERPL-SCNC: 4 MMOL/L (ref 3.5–5.1)
RBC # BLD AUTO: 3.82 MILL/UL (ref 4.2–5.4)
RBC UR QL AUTO: (no result) HPF (ref 0–3)
SODIUM SERPL-SCNC: 140 MMOL/L (ref 136–145)
SP GR UR STRIP: 1.01 (ref 1–1.03)
WBC # BLD AUTO: 9.9 10X3/UL (ref 4.8–10.8)
WBC UR QL AUTO: (no result) HPF (ref 0–3)

## 2023-07-03 PROCEDURE — 83690 ASSAY OF LIPASE: CPT

## 2023-07-03 PROCEDURE — 36415 COLL VENOUS BLD VENIPUNCTURE: CPT

## 2023-07-03 PROCEDURE — 74022 RADEX COMPL AQT ABD SERIES: CPT

## 2023-07-03 PROCEDURE — 85025 COMPLETE CBC W/AUTO DIFF WBC: CPT

## 2023-07-03 PROCEDURE — 74176 CT ABD & PELVIS W/O CONTRAST: CPT

## 2023-07-03 PROCEDURE — 81001 URINALYSIS AUTO W/SCOPE: CPT

## 2023-07-03 PROCEDURE — 96361 HYDRATE IV INFUSION ADD-ON: CPT

## 2023-07-03 PROCEDURE — 80053 COMPREHEN METABOLIC PANEL: CPT

## 2023-07-03 PROCEDURE — 96374 THER/PROPH/DIAG INJ IV PUSH: CPT

## 2024-08-11 NOTE — XMS REPORT
Continuity of Care Document



                           Created on: 2024





LILLIANA SAUNDERS

External Reference #: [id number]

: 1965

Sex: Female



Demographics





                                        Address             [address] [place]  [address]

 

                                        Home Phone          [phone]

 

                                        Work Phone          [phone]

 

                                        Mobile Phone        [phone]

 

                                        Email Address       [email or web address]

 

                                        Preferred Language  English

 

                                        Marital Status      Unknown

 

                                        Hindu Affiliation Unknown

 

                                        Race                Unknown

 

                                        Additional Race(s)  Unavailable

White

 

                                        Ethnic Group        Unknown





Author





                                        Name                Unknown

 

                                        Address             [address]

[place]  [address]

 

                                        \[facility] [place] [facility]\""

[facility]

 

                                        Address             [address]

[address]

[place]  [address]

 

                                        Phone               [phone]





Support





                          Name         Relationship Address      Phone

 

                          Le Millershua CHILD        Unknown      [phone]

[phone]

 

                          Anne Marie Boone Caregiver    Unknown      Unavailable

 

                                1               KAMALJIT          [address]

[place]  [address]                      Unavailable

 

                                2               Personal Relationship [address]

[place]  [address]                      Unavailable

 

                                KAMALJIT MILLER SN              [address] [place]  [address]                     [phone]

 

                                1               KAMALJIT          [address]

[place]  [address]                     Unavailable

 

                                Unavailable     Personal Relationship [address]

[place]  [address]                      Unavailable

 

                          GAUDENCIO BARTON OTHERRELATIONSHIP Unknown      [phone]

 

                                [employer] [place]  [address]                         [phone]





Care Team Providers





                                Care Team Member Name Role            Phone

 

                                Segun France. Primary Care Physici

an Unavailable

 

                                Debi Lux Attending Clinician Unavaila

Nicole Urban Attending Clinician Unavailable

 

                                Faraz Garcia) Attending Clinician Unavaila

DEVORA Patton Attending Clinician UnaYVETTE Tang  Attending Clinician Unavailable

 

                                KEVEN XIE Attending Clinician Unavailab

sujatha

 

                                GC_BCSS_Jose_Lou Attending Clinician Unavaila

Sandie Amador   Attending Clinician Unavailable

 

                                GC_BVWC_Marcus Attending Clinician Unavailable

 

                                DIANE DORAN Attending Clinician Unavailabl

e

 

                                [facility]           Attending Clinician Unavailable

 

                                Candida SCHAEFER, Devora Hinojosa Attending Clinician [phone]

[phone]

 

                                Rayshawn Lira  Attending Clinician Unavailable

 

                                Anne Marie Boone Attending Clinician Unavailable

 

                                Nicole Goemz Admitting Clinician Unavailable

 

                                Faraz Garcia) Admitting Clinician Unavaila

libertad

 

                                GC_BCSS_Jose_Dan1 Admitting Clinician Unavaila

libertad

 

                                GC_BVWC_[facility]_J Admitting Clinician Unavailable







Payers





                    Payer Name Policy Type Policy Number Effective Date Expirati

on Date Source

 

                          BCBS OON     4            [id number] 2023 

00:00:00                                            

 

                                                    [place] 

[place]                         [id number]        [id number] 

00:00:00                                            

 

                          PHCS-IMAGINE 360 2            [id number]    2022 

00:00:00                                            

 

                                                    [place] [employer] 

- BLUECHOICE - 

OPEN ACCESS                             [id number]        2022 

00:00:00                                2022 

00:00:00                                

 

                          MULTIPLAN-GPA/PPO 2            [id number]    2022

 

00:00:00                                            







Problems





                                                    Condition 

Name                                    Condition 

Details                                 Condition 

Category                  Status                    Onset 

Date                                    Resolution 

Date                                    Last 

Treatment 

Date                                    Treating 

Clinician                 Comments                  Source

 

                                                    Abdominal 

pain                                    Abdominal 

Pain                Problem             Active               

00:00:

00                                                               [facility]

 

                                                    Vesicocoli

c fistula                               Vesicocoli

c Fistula           Problem             Active               

00:00:

00                                                               [facility]

 

                                                    Hyperchole

sterolemia                              Hyperchole

sterolemia Problem Active                                          [facility]

 

                                                    Irritable 

bowel                                   Irritable 

bowel   Problem Active                                          [facility]

 

            Cancer Cancer Problem Active                               [facility]

 

            Reflux Reflux Problem Active                               [facility]

 

                                                    Diverticul

osis                                    Diverticul

osis    Problem Active                                          [facility]

 

                                                    Prediabete

s                                       Prediabete

s       Problem Active                                          [facility]

 

                                                    Abdominal 

cramping                                Abdominal 

cramping Problem Active                                          [facility]

 

                                                    Acute 

gastroente

ritis                                   Acute 

gastroente

ritis   Problem Active                                          [facility]

 

                                                    Gastropare

sis                                     Gastropare

sis     Problem Active                                          [facility]

 

            Gallstones Gallstones Problem Active                               [facility]

[facility] 

[facility]







Allergies, Adverse Reactions, Alerts





                                                    Allergy 

Name                                    Allergy 

Type            Status          Severity        Reaction(s)     Onset 

Date                                    Inactive 

Date                                    Treating 

Clinician                 Comments                  Source

 

                                        Garlic              Adverse 

Reaction            Active                                  Info Not 

Available                                                        [facility]







Social History





                    Social Habit Start Date Stop Date Quantity  Comments  Source

 

                                                    Sex Assigned At 

Birth                                   1965 

00:00:00                                1965 

00:00:00            Female                                  [facility]







                          Smoking Status Start Date   Stop Date    Source

 

                          Unknown if ever smoked                           [facility]

on [facility]







Medications





                                                    Ordered 

Medication 

Name                                    Filled 

Medication 

Name                                    Start 

Date                                    Stop 

Date                                    Current 

Medication?                             Ordering 

Clinician       Indication      Dosage          Frequency       Signature 

(SIG)               Comments            Components          Source

 

                                Bactrim DS      Bactrim DS      

3-16 

00:00:

00                                      Yes                 [facility] 

[facility]                                   1 tablet                      [facility] 

[facility]

 

                                                    ciprofloxac

in 500 mg 

tablet TAKE 

1 TABLET BY 

MOUTH TWICE 

DAILY                                   ciprofloxac

in 500 mg 

tablet TAKE 

1 TABLET BY 

MOUTH TWICE 

DAILY                   No                                      ciprofloxa

parker 500 mg 

tablet 

TAKE 1 

TABLET BY 

MOUTH 

TWICE 

DAILY                                                       [facility]

 

                                                    diclofenac 

1 % topical 

gel                                     diclofenac 

1 % topical 

gel                     No                                      diclofenac 

1 % 

topical 

gel                                                         [facility]

 

                                                    diclofenac 

sodium 75 

mg 

tablet,kassy

yed release                             diclofenac 

sodium 75 

mg 

tablet,kassy

yed release                 No                                      diclofenac 

sodium 75 

mg 

tablet,del

ayed 

release                                                     [facility]

 

                                                    methylpredn

isolone 4 

mg tablets 

in a dose 

pack FOLLOW 

PACKAGE 

DIRECTIONS                              methylpredn

isolone 4 

mg tablets 

in a dose 

pack FOLLOW 

PACKAGE 

DIRECTIONS                 No                                      methylpred

nisolone 4 

mg tablets 

in a dose 

pack 

FOLLOW 

PACKAGE 

DIRECTIONS                                                  [facility]

 

                                                    ondansetron 

HCl 4 mg 

tablet                                  ondansetron 

HCl 4 mg 

tablet                  No                                      ondansetro

n HCl 4 mg 

tablet                                                      [facility]

 

                                                    phenazopyri

dine 200 mg 

tablet TAKE 

1 TABLET BY 

MOUTH EVERY 

8 HOURS AS 

NEEDED FOR 

URINARY 

PROBLEMS 

FOR 3 DAYS                              phenazopyri

dine 200 mg 

tablet TAKE 

1 TABLET BY 

MOUTH EVERY 

8 HOURS AS 

NEEDED FOR 

URINARY 

PROBLEMS 

FOR 3 DAYS                 No                                      phenazopyr

idine 200 

mg tablet 

TAKE 1 

TABLET BY 

MOUTH 

EVERY 8 

HOURS AS 

NEEDED FOR 

URINARY 

PROBLEMS 

FOR 3 DAYS                                                  [facility]

 

                                                    sulfamethox

azole 800 

mg-trimetho

prim 160 mg 

tablet TAKE 

1 TABLET BY 

MOUTH EVERY 

DAY BEFORE 

INTERCOURSE                             sulfamethox

azole 800 

mg-trimetho

prim 160 mg 

tablet TAKE 

1 TABLET BY 

MOUTH EVERY 

DAY BEFORE 

INTERCOURSE                 No                                      sulfametho

xazole 800 

mg-trimeth

oprim 160 

mg tablet 

TAKE 1 

TABLET BY 

MOUTH 

EVERY DAY 

BEFORE 

INTERCOURS

E                                                           [facility]

 

                                                    albuterol 

sulfate HFA 

90 

mcg/actuati

on aerosol 

inhaler 

INHALE 2 

PUFFS INTO 

THE LUNGS 

EVERY 4 

HOURS AS 

NEEDED FOR 

WHEEZING                                albuterol 

sulfate HFA 

90 

mcg/actuati

on aerosol 

inhaler 

INHALE 2 

PUFFS INTO 

THE LUNGS 

EVERY 4 

HOURS AS 

NEEDED FOR 

WHEEZING                 No                                      albuterol 

sulfate 

HFA 90 

mcg/actuat

ion 

aerosol 

inhaler 

INHALE 2 

PUFFS INTO 

THE LUNGS 

EVERY 4 

HOURS AS 

NEEDED FOR 

WHEEZING                                                    [facility]

 

                                                    amoxicillin 

500 

mg-potassiu

m 

clavulanate 

125 mg 

tablet TAKE 

1 TABLET BY 

MOUTH TWICE 

DAILY                                   amoxicillin 

500 

mg-potassiu

m 

clavulanate 

125 mg 

tablet TAKE 

1 TABLET BY 

MOUTH TWICE 

DAILY                   No                                      amoxicilli

n 500 

mg-potassi

um 

clavulanat

e 125 mg 

tablet 

TAKE 1 

TABLET BY 

MOUTH 

TWICE 

DAILY                                                       [facility]

 

                                                    amoxicillin 

875 

mg-potassiu

m 

clavulanate 

125 mg 

tablet TAKE 

1 TABLET BY 

MOUTH EVERY 

12 HOURS 

FOR 10 DAYS                             amoxicillin 

875 

mg-potassiu

m 

clavulanate 

125 mg 

tablet TAKE 

1 TABLET BY 

MOUTH EVERY 

12 HOURS 

FOR 10 DAYS                 No                                      amoxicilli

n 875 

mg-potassi

um 

clavulanat

e 125 mg 

tablet 

TAKE 1 

TABLET BY 

MOUTH 

EVERY 12 

HOURS FOR 

10 DAYS                                                     [facility]

 

                                                    azithromyci

n 250 mg 

tablet TAKE 

2 TABLETS 

BY MOUTH 

FOR 1 DAY 

THEN TAKE 1 

TABLET BY 

MOUTH DAILY 

FOR 4 DAYS 

THEREAFTER                              azithromyci

n 250 mg 

tablet TAKE 

2 TABLETS 

BY MOUTH 

FOR 1 DAY 

THEN TAKE 1 

TABLET BY 

MOUTH DAILY 

FOR 4 DAYS 

THEREAFTER                 No                                      azithromyc

in 250 mg 

tablet 

TAKE 2 

TABLETS BY 

MOUTH FOR 

1 DAY THEN 

TAKE 1 

TABLET BY 

MOUTH 

DAILY FOR 

4 DAYS 

THEREAFTER                                                  [facility]

 

                                                    benzonatate 

100 mg 

capsule 

TAKE 1 

CAPSULE BY 

MOUTH EVERY 

8 HOURS AS 

NEEDED FOR 

COUGH                                   benzonatate 

100 mg 

capsule 

TAKE 1 

CAPSULE BY 

MOUTH EVERY 

8 HOURS AS 

NEEDED FOR 

COUGH                   No                                      benzonatat

e 100 mg 

capsule 

TAKE 1 

CAPSULE BY 

MOUTH 

EVERY 8 

HOURS AS 

NEEDED FOR 

COUGH                                                       [facility]

 

                                                    Bin[employer] 

COVID-19 Ag 

Self Test 

kit TEST AS 

DIRECTED 

TODAY                                   [facility] 

COVID-19 Ag 

Self Test 

kit TEST AS 

DIRECTED 

TODAY                   No                                      [facility] 

COVID-19 

Ag Self 

Test kit 

TEST AS 

DIRECTED 

TODAY                                                       [facility]

 

                                                    cefdinir 

300 mg 

capsule 

TAKE 1 

CAPSULE BY 

MOUTH TWICE 

DAILY FOR 

10 DAYS                                 cefdinir 

300 mg 

capsule 

TAKE 1 

CAPSULE BY 

MOUTH TWICE 

DAILY FOR 

10 DAYS                 No                                      cefdinir 

300 mg 

capsule 

TAKE 1 

CAPSULE BY 

MOUTH 

TWICE 

DAILY FOR 

10 DAYS                                                     [facility]

 

                                                    ciprofloxac

in 250 mg 

tablet TAKE 

1 TABLET BY 

MOUTH EVERY 

12 HOURS 

FOR 7 DAYS                              ciprofloxac

in 250 mg 

tablet TAKE 

1 TABLET BY 

MOUTH EVERY 

12 HOURS 

FOR 7 DAYS                 No                                      ciprofloxa

parker 250 mg 

tablet 

TAKE 1 

TABLET BY 

MOUTH 

EVERY 12 

HOURS FOR 

7 DAYS                                                      [facility]

 

                                                    ciprofloxac

in 500 mg 

tablet TAKE 

1 TABLET BY 

MOUTH TWICE 

DAILY                                   ciprofloxac

in 500 mg 

tablet TAKE 

1 TABLET BY 

MOUTH TWICE 

DAILY                   No                                      ciprofloxa

parker 500 mg 

tablet 

TAKE 1 

TABLET BY 

MOUTH 

TWICE 

DAILY                                                       [facility]

 

                                                    diclofenac 

1 % topical 

gel                                     diclofenac 

1 % topical 

gel                     No                                      diclofenac 

1 % 

topical 

gel                                                         [facility] 

[facility]

 

                                                    diclofenac 

sodium 75 

mg 

tablet,kassy

yed release                             diclofenac 

sodium 75 

mg 

tablet,kassy

yed release                 No                                      diclofenac 

sodium 75 

mg 

tablet,del

ayed 

release                                                     [facility] 

[facility]

 

                                                    methylpredn

isolone 4 

mg tablets 

in a dose 

pack FOLLOW 

PACKAGE 

DIRECTIONS                              methylpredn

isolone 4 

mg tablets 

in a dose 

pack FOLLOW 

PACKAGE 

DIRECTIONS                 No                                      methylpred

nisolone 4 

mg tablets 

in a dose 

pack 

FOLLOW 

PACKAGE 

DIRECTIONS                                                  [facility]

 

                                                    ondansetron 

HCl 4 mg 

tablet                                  ondansetron 

HCl 4 mg 

tablet                  No                                      ondansetro

n HCl 4 mg 

tablet                                                      [facility]

 

                                                    phenazopyri

dine 200 mg 

tablet TAKE 

1 TABLET BY 

MOUTH EVERY 

8 HOURS AS 

NEEDED FOR 

URINARY 

PROBLEMS 

FOR 3 DAYS                              phenazopyri

dine 200 mg 

tablet TAKE 

1 TABLET BY 

MOUTH EVERY 

8 HOURS AS 

NEEDED FOR 

URINARY 

PROBLEMS 

FOR 3 DAYS                 No                                      phenazopyr

idine 200 

mg tablet 

TAKE 1 

TABLET BY 

MOUTH 

EVERY 8 

HOURS AS 

NEEDED FOR 

URINARY 

PROBLEMS 

FOR 3 DAYS                                                  [facility]

 

                                                    sulfamethox

azole 800 

mg-trimetho

prim 160 mg 

tablet TAKE 

1 TABLET BY 

MOUTH EVERY 

DAY BEFORE 

INTERCOURSE                             sulfamethox

azole 800 

mg-trimetho

prim 160 mg 

tablet TAKE 

1 TABLET BY 

MOUTH EVERY 

DAY BEFORE 

INTERCOURSE                 No                                      sulfametho

xazole 800 

mg-trimeth

oprim 160 

mg tablet 

TAKE 1 

TABLET BY 

MOUTH 

EVERY DAY 

BEFORE 

INTERCOURS

E                                                           [facility]

 

                                                    albuterol 

sulfate HFA 

90 

mcg/actuati

on aerosol 

inhaler 

INHALE 2 

PUFFS INTO 

THE LUNGS 

EVERY 4 

HOURS AS 

NEEDED FOR 

WHEEZING                                albuterol 

sulfate HFA 

90 

mcg/actuati

on aerosol 

inhaler 

INHALE 2 

PUFFS INTO 

THE LUNGS 

EVERY 4 

HOURS AS 

NEEDED FOR 

WHEEZING                 No                                      albuterol 

sulfate 

HFA 90 

mcg/actuat

ion 

aerosol 

inhaler 

INHALE 2 

PUFFS INTO 

THE LUNGS 

EVERY 4 

HOURS AS 

NEEDED FOR 

WHEEZING                                                    [facility]

 

                                                    amoxicillin 

500 

mg-potassiu

m 

clavulanate 

125 mg 

tablet TAKE 

1 TABLET BY 

MOUTH TWICE 

DAILY                                   amoxicillin 

500 

mg-potassiu

m 

clavulanate 

125 mg 

tablet TAKE 

1 TABLET BY 

MOUTH TWICE 

DAILY                   No                                      amoxicilli

n 500 

mg-potassi

um 

clavulanat

e 125 mg 

tablet 

TAKE 1 

TABLET BY 

MOUTH 

TWICE 

DAILY                                                       [facility]

 

                                                    amoxicillin 

875 

mg-potassiu

m 

clavulanate 

125 mg 

tablet TAKE 

1 TABLET BY 

MOUTH EVERY 

12 HOURS 

FOR 10 DAYS                             amoxicillin 

875 

mg-potassiu

m 

clavulanate 

125 mg 

tablet TAKE 

1 TABLET BY 

MOUTH EVERY 

12 HOURS 

FOR 10 DAYS                 No                                      amoxicilli

n 875 

mg-potassi

um 

clavulanat

e 125 mg 

tablet 

TAKE 1 

TABLET BY 

MOUTH 

EVERY 12 

HOURS FOR 

10 DAYS                                                     [facility]

 

                    Nexium    Nexium                        Yes       [facility] 

Jurado                                   1 capsule                     [facility]

 

                                                    azithromyci

n 250 mg 

tablet TAKE 

2 TABLETS 

BY MOUTH 

FOR 1 DAY 

THEN TAKE 1 

TABLET BY 

MOUTH DAILY 

FOR 4 DAYS 

THEREAFTER                              azithromyci

n 250 mg 

tablet TAKE 

2 TABLETS 

BY MOUTH 

FOR 1 DAY 

THEN TAKE 1 

TABLET BY 

MOUTH DAILY 

FOR 4 DAYS 

THEREAFTER                 No                                      azithromyc

in 250 mg 

tablet 

TAKE 2 

TABLETS BY 

MOUTH FOR 

1 DAY THEN 

TAKE 1 

TABLET BY 

MOUTH 

DAILY FOR 

4 DAYS 

THEREAFTER                                                  [facility]

 

                                                    benzonatate 

100 mg 

capsule 

TAKE 1 

CAPSULE BY 

MOUTH EVERY 

8 HOURS AS 

NEEDED FOR 

COUGH                                   benzonatate 

100 mg 

capsule 

TAKE 1 

CAPSULE BY 

MOUTH EVERY 

8 HOURS AS 

NEEDED FOR 

COUGH                   No                                      benzonatat

e 100 mg 

capsule 

TAKE 1 

CAPSULE BY 

MOUTH 

EVERY 8 

HOURS AS 

NEEDED FOR 

COUGH                                                       [facility]

 

                                                    [employer] 

COVID-19 Ag 

Self Test 

kit TEST AS 

DIRECTED 

TODAY                                   [employer] 

COVID-19 Ag 

Self Test 

kit TEST AS 

DIRECTED 

TODAY                   No                                      [employer] 

COVID-19 

Ag Self 

Test kit 

TEST AS 

DIRECTED 

TODAY                                                       [facility]

 

                                                    cefdinir 

300 mg 

capsule 

TAKE 1 

CAPSULE BY 

MOUTH TWICE 

DAILY FOR 

10 DAYS                                 cefdinir 

300 mg 

capsule 

TAKE 1 

CAPSULE BY 

MOUTH TWICE 

DAILY FOR 

10 DAYS                 No                                      cefdinir 

300 mg 

capsule 

TAKE 1 

CAPSULE BY 

MOUTH 

TWICE 

DAILY FOR 

10 DAYS                                                     [facility]

 

                                                    ciprofloxac

in 250 mg 

tablet TAKE 

1 TABLET BY 

MOUTH EVERY 

12 HOURS 

FOR 7 DAYS                              ciprofloxac

in 250 mg 

tablet TAKE 

1 TABLET BY 

MOUTH EVERY 

12 HOURS 

FOR 7 DAYS                 No                                      ciprofloxa

parker 250 mg 

tablet 

TAKE 1 

TABLET BY 

MOUTH 

EVERY 12 

HOURS FOR 

7 DAYS                                                      [facility]







Procedures





                          Procedure    Date / Time Performed Performing Clinicia

n Source

 

                          CT Abdomen Pelvis WO Con 2023 20:25:00          

    [facility]

 

                                                    XR Abdomen 2 View/1 View 

Cxr                 2023 17:06:00                     [facility]

 

                                                    Hysterectomy (Ovaries 

Remain)                                                     [facility]

 

                          Oral / Dental Surgery                           [facility]







Plan of Care





                      Planned Activity Planned Date Details    Comments   Source

 

                      Instructions                                  [facility]

al







Encounters





                                                    Start 

Date/Time                               End 

Date/Time                               Encounter 

Type                                    Admission 

Type                                    Attending 

Clinicians                              Care 

Facility                                Care 

Department                              Encounter 

ID                                      Source

 

                                                    2024 

08:06:00                        Outpatient                      [facility] Bergeron            [facility]              [id number]

[id number]                                   [facility]

 

                                                    2023 

10:44:00                        Inpatient       Urgent          Ene Gomezy                    [facility]                     Medical 

Service                                 [id number]

73                                      [facility]

 

                                                    2023 

11:56:00                        Inpatient       Elective        Faraz Garcia)               [facility]                     Surgical 

Service                                 [id number]

22                                      [facility]

 

                                                    2023 

10:19:00                        Outpatient                      Debi Bergeron            [facility]              [id number]

[id number]                                   [facility]

 

                                                    2022 

14:45:00                        Outpatient                      Debi Bergeron            [facility]              [facility]              [id number]

[id number]                                   [facility]

 

                                                    2022 

09:04:28                        Outpatient                      Debi Bergeron            [facility]              [facility]              [id number]

[id number]                                   [facility]

 

                                                    2022 

11:58:18                        Outpatient                      Debi Esteban               [facility]              [facility]              [id number]

[id number]                                   [facility]

 

                                                    2023 

16:00:00                                2023 

16:00:00            Outpatient                              DEVORA TIRADO          [id number]       [facility]

 

                                                    2023 

00:00:00                                2023 

00:00:00            Outpatient                              YVETTE WAGONER          [id number]       [facility]

 

                                                    2023-10-16 

08:00:00                                2023-10-16 

08:00:00            Outpatient                              KEVEN XIE          [id number]       [facility]

 

                                                    2023-07-10 

00:00:00                                2023-07-10 

00:00:00            Outpatient                              GC_BCSS_How

ell_Dan1            PRIV                PRIV                [id number]

[id number]                                 [facility]

 

                                                    2023-07-10 

00:00:00                                2023-07-10 

00:00:00            Outpatient                              GC_BCSS_How

ell_Dan1            PRIV                PRIV                [id number]

[id number]                                 [facility]

 

                                                    2023 

15:13:00                                2023 

05:20:00            Emergency           ER                  Sandie Soriano               [facility]              [id number]

[id number]                               [facility]

 

                                                    2023 

00:00:00                                2023 

00:00:00            Outpatient                              DEVORA TIRADO          [id number]       [facility]

 

                                                    2023 

00:00:00                                2023 

00:00:00            Outpatient                              YVETTE WAGONER          [id number]       [facility]

 

                                                    2023 

00:00:00                                2023 

00:00:00            Outpatient                              GC_BCSS_How

melchor_Dan1            PRIV                PRIV                [id number]

[id number]                                 [facility]

 

                                                    2023 

00:00:00                                2023 

00:00:00            Outpatient                              GC_BCSS_How

ell_Dan1            PRIV                PRIV                [id number]

[id number]                                 [facility] 

Medical

 

                                                    2023 

00:00:00                                2023 

00:00:00            Outpatient                              GC_BCSS_How

ell_Dan1            PRIV                PRIV                [id number]

[id number]                                 [facility]

 

                                                    2023 

00:00:00                                2023 

00:00:00            Outpatient                              GC_BCSS_How

ell_Dan1            PRIV                PRIV                [id number]

[id number]                                 [facility]

 

                                                    2023-05-15 

00:00:00                                2023-05-15 

00:00:00            Outpatient                              GC_BCSS_How

ell_Dan1            PRIV                PRIV                [id number]

[id number]                                 [facility]

 

                                                    2023 

00:00:00                                2023 

00:00:00            Outpatient                              GC_BCSS_How

ell_Dan1            PRIV                PRIV                [id number]

[id number]                                 [facility]

 

                                                    2023 

00:00:00                                2023 

00:00:00            Outpatient                              GC_BCSS_How

ell_Dan1            PRIV                PRIV                [id number]

[id number]                                 [facility]

 

                                                    2023 

00:00:00                                2023 

00:00:00                                Faraz Garcia MD: [address] [place] [address] 

[phone]

, Ph. 

[phone]                                        [facility] - 

GC_BCSS_11s

t Office                  [id number]                  [facility]

 

                                                    2023 

00:00:00                                2023 

00:00:00            Outpatient                              GC_BCSS_How

ell_Dan1            PRIV                PRIV                [id number]

[id number]                                 [facility]

 

                                                    2023 

00:00:00                                2023 

00:00:00            Outpatient                              GC_BCSS_How

ell_Dan1            PRIV                PRIV                [id number]

[id number]                                 [facility]

 

                                                    2023 

00:00:00                                2023 

00:00:00            Outpatient                              GC_BCSS_How

ell_Dan1            PRIV                PRIV                [id number]

[id number]                                 [facility]

 

                                                    2023 

00:00:00                                2023 

00:00:00            Outpatient                              GC_BCSS_How

ell_Dan1            PRIV                PRIV                [id number]

[id number]                                 [facility]

 

                                                    2023 

00:00:00                                2023 

00:00:00            Outpatient                              DEVORA TIRADO          [id number]       [facility]

 

                                                    2022 

00:00:00                                2022 

00:00:00            Outpatient                              GC_BVWC_Sou

th_J                PRIV                PRIV                [id number]

[id number]                                 [facility]

 

                                                    2022 

00:00:00                                2022 

00:00:00            Outpatient                              DEVORA TIRADO          [id number]       [facility]

 

                                                    2022-11-10 

00:00:00                                2022-11-10 

00:00:00            Outpatient                              YVETTE WAGONER          [id number]       [facility]

 

                                                    2022-10-10 

12:20:00                                2022-10-10 

12:20:00  Outpatient                     SIENNA    SIENNA    [id number] [facility]

 

                                                    2022-10-10 

00:00:00                                2022-10-10 

00:00:00            Outpatient                              DEVORA TIRADOAREN HSU          [id number]       [facility]

 

                                                    2022 

00:00:00                                2022 

00:00:00            Outpatient                              DEVORA TIRADO           SIENNA HSU          [id number]       [facility]

 

                                                    2022-09-15 

13:00:00                                2022-09-15 

13:00:00  Outpatient           DIANE DORAN    SIENNA    [id number] [facility]

 

                                                    2022-09-15 

11:25:00                                2022-09-15 

11:25:00  Outpatient           LABAnt HSU    SIENNA    [id number] [facility]

 

                                                    2022-09-15 

00:00:00                                2022-09-15 

00:00:00            Outpatient                              YVETTE WAGONER          SIENNA          [id number]       [facility]

 

                                                    2022-09-15 

00:00:00                                2022-09-15 

00:00:00            Outpatient                              YVETTE WAGONER          SIENNA          [id number]       [facility]

 

                                                    2022 

00:00:00                                2022 

00:00:00            Outpatient                              YVETTE WAGONER          SIENNA          [id number]       [facility]

 

                                                    2022 

00:00:00                                2022 

00:00:00            Outpatient                              DEVORA TIRADO           SIENNA HSU          [id number]       [facility]

 

                                                    2022 

10:05:00                                2022 

10:05:00  Outpatient           [facility]    SIENNA    [id number] [facility]

 

                                                    2022 

09:30:00                                2022 

09:45:00                                Office 

Visit                                               CandidaMckenziemik Beltrán                                 [id number]

[id number]

1.2.7.2.686

[id number]

0                         [id number]                 [facility]

 

                                                    2022 

00:00:00                                2022 

00:00:00            Outpatient                              CANDIDADEVORA RANGEL           SIENNA HSU          [id number]       [facility]

 

                                                    2022 

10:45:00                                2022 

11:00:00                                Office 

Visit                                               Devora Tirado                                 [id number]

[id number]

1.2.7.2.686

[id number]

0                         [id number]                 [facility]

 

                                                    2022-08-15 

16:15:00                                2022-08-15 

16:15:00            Outpatient                              DEVORA TIRADO           SIENNA HSU          [id number]       [facility]

 

                                                    2022-08-15 

00:00:00                                2022-08-15 

00:00:00            Outpatient                              DEVORA TIRADO           SIENNA HSU          [id number]       [facility]

 

                                                    2022 

00:00:00                                2022 

00:00:00            Outpatient                              DEVORA TIRADO           SIENNA HSU          [id number]       [facility]

 

                                                    2022 

00:00:00                                2022 

00:00:00            Outpatient                              DEVORA TIRADO           SIENNA HSU          [id number]       [facility]

 

                                                    2022 

14:50:00                                2022 

14:50:00  Outpatient           LAB90     SIENNA HSU    [id number] [facility]

 

                                                    2022 

14:00:00                                2022 

14:15:00                                Office 

Visit                                               Devora Tirado                                 [id number]

[id number]

1.2.7.2.686

[id number]

0                         [id number]                 [facility]

 

                                                    2021-10-18 

01:29:00                                2021-10-18 

01:29:00            Emergency           ER                  Rayshawn Lira               [facility]              [facility]              [id number]

[id number]                               [facility]

 

                                                    2020 

11:20:00                                2020 

11:20:00        Outpatient                                      [facility]                  [id number]                   [facility]

 

                                                    2020 

08:20:00                                2020 

08:20:00        Outpatient                                      [facility]                  [id number]                   [facility]

 

                                                    2020 

14:39:00                                2020 

14:39:00        Outpatient                                      [facility]

t [facility] 

Family 

Medicine                                [facility]                  [id number]                   [facility]

 

                                                    2020 

15:45:00                                2020 

15:45:00        Outpatient                                      [facility]

t [facility]                  [id number]                   [facility]

 

                                                    2020 

14:00:00                                2020 

14:00:00        Outpatient                                      [facility]

t [facility]                  [id number]                   [facility]

 

                                                    2020 

11:16:00                                2020 

11:16:00        Outpatient                                      [facility]

[facility]                  [id number]                   [facility]

 

                                                    2020 

15:52:00                                2020 

15:52:00            Outpatient                              [facility]         [facility]             [id number]          [facility]

 

                                                    2020 

15:00:00                                2020 

15:00:00            Outpatient                              [facility] Boone         [facility]             [id number]          [facility]

 

                                                    2019 

08:40:00                                2019 

08:40:00        Outpatient                                      [facility]                  [id number]                   [facility]







Results





                    Test Description Test Time Test Comments Results   Result Co

mments Source







                                        

 

                                        

 

                                        

 

                                        

 

                                        

 

                                        

 

                                        

 

                                        

 

                                        

 

                                        

 

                                        

 

                                        

 

                                        

 

                                        

 

                                        

 

                                        

 

                                        

 

                                        

 

                                        

 

                                        





HIndications to order a Urinalysis: AlteredMental st.lethargyUrine Source: Urine
[facility] [id number] 18:54:00* 



                      Test Item  Value      Reference Range Interpretation Comme

[facility]

 

                      Urinalysis (test code = UACRFLXNO) No                     

          





HIndications to order a Urinalysis: AlteredMental st.lethargyUrine Source: Urine
[facility] CatheterUrine [id number] 18:29:00* 



                      Test Item  Value      Reference Range Interpretation Comme

[facility]

 

                      Urine Color (test code = 5778-6) Yellow     Yellow        

        





[facility] [id number] 18:29:00* 



                      Test Item  Value      Reference Range Interpretation Comme

[facility]

 

                      Urine Clarity (test code = 71294-3) Hazy       Clear      

           





[facility] gravity of Urine by Test strip
2023 18:29:00* 



                      Test Item  Value      Reference Range Interpretation Comme

[facility]

 

                                                    Urine Specific Gravity (test

 code = 

5811-5)         1.015           1.005-1.030                     





[facility] pH measurement by automated test strip
2023 18:29:00* 



                      Test Item  Value      Reference Range Interpretation Comme

[facility]

 

                      Urine pH (test code = 15494-5) 7.0        5.0-9.0         

      





[facility] leukocyte esterase detection by automated 
test [id number] 18:29:00* 



                      Test Item  Value      Reference Range Interpretation Comme

[facility]

 

                                                    Urine Leukocyte Esterase (te

st code = 

94205-2)        Trace           Negative                        





[facility] Hospital[facility]it[facility] [Presence] in Urine by Test strip
2023 18:29:00* 



                      Test Item  Value      Reference Range Interpretation Comme

[facility]

 

                      Urine Nitrite (test code = 5802-4) Negative   Negative    

          





[facility] protein measurement by automated test 
strip (mass/volume)2023 18:29:00* 



                      Test Item  Value      Reference Range Interpretation Comme

[facility]

 

                                                    Urine Protein (test code = 

79542-9)        Negative mg/dL  Neg-Trace                       





[facility] glucose measurement by test strip 
(mass/volume)2023 18:29:00* 



                      Test Item  Value      Reference Range Interpretation Comme

[facility]

 

                                                    Urine Glucose (UA) (test cod

e 

= 5792-7)       Negative mg/dL  Negative                        





[facility] ketones measurement by automated test 
strip (mass/volume)2023 18:29:00* 



                      Test Item  Value      Reference Range Interpretation Comme

[facility]

 

                      Urine Ketones (test code = 05369-7) 80 mg/dL   Negative   

           





[facility] urobilinogen measurement (units/volume) by
 test rfvze2664-43-94 18:29:00* 



                      Test Item  Value      Reference Range Interpretation Comme

[facility]

 

                                                    Urine Urobilinogen (test cod

e = 

75305-7)        0.2 mg/dL       Less than 2                     





[facility] total bilirubin detection by automated 
test afivp3121-04-23 18:29:00* 



                      Test Item  Value      Reference Range Interpretation Comme

[facility]

 

                                                    Urine Bilirubin (test code =

 

87059-6)        Negative        Negative                        





[facility] Care[facility] hemoglobin detection by automated test 
uspgo5231-84-88 18:29:00* 



                      Test Item  Value      Reference Range Interpretation Comme

[facility]

 

                      Urine Blood (test code = 74294-1) Large      Negative     

         





[facility] HospitalU[facility] sediment erythrocyte count by microscopy 
(number/high power field)2023 18:29:00* 



                      Test Item  Value      Reference Range Interpretation Comme

[facility]

 

                      Urine RBC (test code = 84665-5) 21-50 HPF  0-3            

       





[facility] HospitalLeukocytes detection in urine sediment by light 
bkllkrdixh1646-70-08 18:29:00* 



                      Test Item  Value      Reference Range Interpretation Comme

[facility]

 

                      Urine WBC (test code = 38840-4) 0-3 HPF    0-3            

       





[facility] HospitalSquamous epithelial cells detection in urine 
sediment by light zrzhgkegez8001-29-29 18:29:00* 



                      Test Item  Value      Reference Range Interpretation Comme

[facility]

 

                                                    Urine Squamous Epithelial Ce

lls (test 

code = 18240-8) 0-3 HPF         0-3                             





[facility] HospitalBacteria detection in urine sediment by light 
oqihkoevoq8689-26-63 18:29:00* 



                      Test Item  Value      Reference Range Interpretation Comme

[facility]

 

                                                    Urine Bacteria (test code = 

59585-1)        Rare-Few HPF    None Seen                       





[facility] HospitalMucus detection in urine sediment by light 
igehnkpgvi1936-13-02 18:29:00* 



                      Test Item  Value      Reference Range Interpretation Comme

nts

 

                                                    Urine Mucus (test 

code = 8247-9)  Few LPF         See_Comment                     [Automated messa

ge] The 

system which generated 

this result transmitted 

reference range: <2+. The 

reference range was not 

used to interpret this 

result as normal/abnormal.





[facility] HospitalDo Not [id number] 18:29:00* 



                      Test Item  Value      Reference Range Interpretation Comme

nts

 

                                                    Urine Culture Reflexed (test

 code = 

LOINC)          No                                              





[facility] HospitalChemistry2023-07-03 18:13:00* 



                      Test Item  Value      Reference Range Interpretation Comme

nts

 

                                                    Chemistry (test code = 

NA-T)           140 mmol/L      136-145         N               

 

                                                    Chemistry (test code = 

K-T)            4.0 mmol/L      3.5-5.1         N               

 

                                                    Chemistry (test code = 

CL-T)           102 mmol/L                N               

 

                                                    Chemistry (test code = 

CO2-T)          28 mmol/L       22-29           N               

 

                                                    Chemistry (test code = 

ANGP)           14 mmol/L       10-20           N               

 

                                                    Chemistry (test code = 

BUN)            6 mg/dL         9.8-20.1        L               

 

                                                    Chemistry (test code = 

CREATT)         0.68 mg/dL      0.6-1.1         N               

 

                                                    Chemistry (test code = 

EGFRCR)         102                                             Reference Range 

for 

Estimated GFR: 

Greater than 90 

mL/min/1.73 

o0Ryyapnkf eGFR is 

based on the CKD-EPI 

202 equation 

thatdoes not use a 

race coefficient.

 

                                                    Chemistry (test code = 

GLU-T)          96 mg/dL                  N               

 

                                                    Chemistry (test code = 

CA-T)           9.3 mg/dL       7.8-10.44       N               

 

                                                    Chemistry (test code = 

TBILI-T)        0.3 mg/dL       0.2-1.2         N               

 

                                                    Chemistry (test code = 

TP)             7.0 g/dL        6.0-8.3         N               

 

                                                    Chemistry (test code = 

ALB)            3.8 g/dL        3.5-5.0         N               

 

                                                    Chemistry (test code = 

GLOB)           3.2 g/dL        2.4-3.5         N               

 

                                                    Chemistry (test code = 

AG)             1.2 g/dL        1.2-2.2         N               

 

                                                    Chemistry (test code = 

ALP)            76 U/L                    N               

 

                                                    Chemistry (test code = 

AST)            67 U/L          5-34            H               

 

                                                    Chemistry (test code = 

ALT)            110 U/L         8-55            H               





S PXlsjyrpnp2863-10-49 18:13:00* 



                      Test Item  Value      Reference Range Interpretation Comme

nts

 

                      Chemistry (test code = LIP) 7 U/L      8-78       L       

   





S ZVnhckxmcno8064-78-35 17:53:00* 



                      Test Item  Value      Reference Range Interpretation Comme

nts

 

                      Hematology (test code = WBCT) 9.9 10x3/uL 4.8-10.8   N    

      

 

                      Hematology (test code = RBCT) 3.82 mill/uL 4.20-5.40  L   

       

 

                      Hematology (test code = HGBT) 11.3 g/dL  12.0-16.0  L     

     

 

                      Hematology (test code = HCTT) 35.2 %     36.0-47.0  L     

     

 

                      Hematology (test code = MCV) 92.2 fl    78.0-98.0  N      

    

 

                      Hematology (test code = MCH) 29.7 pg    27.0-31.0  N      

    

 

                      Hematology (test code = MCHC) 32.2 g/dL  32.0-36.0  N     

     

 

                      Hematology (test code = RDW) 12.5 %     11.5-14.5  N      

    

 

                      Hematology (test code = PLTT) 332 10x3/uL 130-400    N    

      

 

                      Hematology (test code = MPV) 9.5 fL     7.4-10.4   N      

    

 

                      Hematology (test code = %NEUT) 69.8 %     42.0-75.0  N    

      

 

                      Hematology (test code = %LYMPH) 16.4 %     21.0-51.0  L   

       

 

                      Hematology (test code = %MONO) 11.0 %     0.0-10.0   H    

      

 

                      Hematology (test code = %EOS) 1.5 %      0.0-10.0   N     

     

 

                      Hematology (test code = %BASO) 1.4 %      0.0-1.0    H    

      

 

                      Hematology (test code = NEUT#) 6.9 thou/uL 1.40-6.50  H   

       

 

                      Hematology (test code = LYMPH#) 1.6 thou/uL 1.20-3.40  N  

        

 

                      Hematology (test code = MONO#) 1.1 thou/uL 0.11-0.59  H   

       

 

                      Hematology (test code = EOS#) 0.1 thou/uL 0.0-0.7    N    

      

 

                      Hematology (test code = BASO#) 0.1 thou/uL 0.0-0.2    N   

       





SSerum or plasma sodium measurement (moles/volume)2023 17:40:00* 



                      Test Item  Value      Reference Range Interpretation Comme

[facility]

 

                      Sodium Level (test code = 2951-2) 140 mmol/L 136-145      

         





[facility] 
(moles/volume)2023 17:40:00* 



                      Test Item  Value      Reference Range Interpretation Comme

nts

 

                                                    Potassium Level (test code =

 

2823-3)         4.0 mmol/L      3.5-5.1                         





[facility] or plasma chloride measurement 
(moles/volume)2023 17:40:00* 



                      Test Item  Value      Reference Range Interpretation Comme

nts

 

                                                    Chloride Level (test code = 

2075-0)         102 mmol/L                                





[facility] or plasma carbon dioxide, total 
measurement (moles/volume)2023 17:40:00* 



                      Test Item  Value      Reference Range Interpretation Comme

nts

 

                                                    Carbon Dioxide Level (test c

ode = 

-9)         28 mmol/L       22-29                           





[facility] or plasma anion [id number] 17:40:00* 



                      Test Item  Value      Reference Range Interpretation Comme

nts

 

                      Anion Gap (test code = 33037-3) 14 mmol/L  10-20          

       





[facility] or plasma urea nitrogen measurement 
(mass/volume)2023 17:40:00* 



                      Test Item  Value      Reference Range Interpretation Comme

nts

 

                                                    Blood Urea Nitrogen (test co

de = 

3094-0)         6 mg/dL         9.8-20.1                        





[facility] or plasma creatinine measurement 
(mass/volume)2023 17:40:00* 



                      Test Item  Value      Reference Range Interpretation Comme

nts

 

                      Creatinine (test code = 2160-0) 0.68 mg/dL 0.6-1.1        

       





[facility] CareGlomerular filtration rate/1.73 sq M.predicted 
[Volume Rate/Area] in Serum, Plasma [id number] 17:40:00* 



                      Test Item  Value      Reference Range Interpretation Comme

nts

 

                                                    Estimated GFR (CKD-EPI )

 (test code 

= 50255-9)      102                                             





[facility] CareGlucose [Mass/volume] in Serum or Plasma
2023 17:40:00* 



                      Test Item  Value      Reference Range Interpretation Comme

nts

 

                      Glucose Level (test code = 2345-7) 96 mg/dL         

          





[facility] or plasma calcium measurement 
(mass/volume)2023 17:40:00* 



                      Test Item  Value      Reference Range Interpretation Comme

nts

 

                      Calcium Level (test code = 72689-3) 9.3 mg/dL  7.8-10.44  

           





[facility] or plasma total bilirubin measurement 
(mass/volume)2023 17:40:00* 



                      Test Item  Value      Reference Range Interpretation Comme

nts

 

                                                    Total Bilirubin (test code =

 

1975-2)         0.3 mg/dL       0.2-1.2                         





[facility] or plasma protein measurement 
(mass/volume)2023 17:40:00* 



                      Test Item  Value      Reference Range Interpretation Comme

[facility]

 

                                                    Serum Total Protein (test co

de = 

2885-2)         7.0 g/dL        6.0-8.3                         





[facility] or plasma albumin measurement by 
bromocresol green (BCG) dye binding method ([id number] 17:40:00* 



                      Test Item  Value      Reference Range Interpretation Comme

nts

 

                      Albumin (test code = 20764-6) 3.8 g/dL   3.5-5.0          

     





[facility] CareGlobulin [Mass/volume] in Serum by calculation
2023 17:40:00* 



                      Test Item  Value      Reference Range Interpretation Comme

nts

 

                      Globulin (test code = 46398-0) 3.2 g/dL   2.4-3.5         

      





[facility] HospitalAlbumin/Globulin [Mass Ratio] in Serum or Plasma
2023 17:40:00* 



                      Test Item  Value      Reference Range Interpretation Comme

[facility]

 

                                                    Albumin/Globulin Ratio (test

 code = 

1759-0)         1.2 g/dL        1.2-2.2                         





[facility] HospitalAlkaline phosphatase [Enzymatic activity/volume]
in Serum or Qbccoy4929-81-28 17:40:00* 



                      Test Item  Value      Reference Range Interpretation Comme

nts

 

                                                    Alkaline Phosphatase (test c

ode = 

6768-6)         76 U/L                                    





[facility] or plasma aspartate aminotransferase 
measurement (enzymatic activity/volume)2023 17:40:00* 



                      Test Item  Value      Reference Range Interpretation Comme

nts

 

                                                    Aspartate Amino Transf (AST/

SGOT) 

(test code = 1920-8) 67 U/L          5-34                            





[facility] or plasma alanine aminotransferase 
measurement without P-5'-P (enzymatic sglcjn0558-89-63 17:40:00* 



                      Test Item  Value      Reference Range Interpretation Comme

nts

 

                                                    Alanine Aminotransferase (AL

T/SGPT) 

(test code = 1744-2) 110 U/L         8-55                            





[facility] or plasma lipase measurement (enzymatic 
activity/volume)2023 17:40:00* 



                      Test Item  Value      Reference Range Interpretation Comme

nts

 

                      Lipase (test code = 3040-3) 7 U/L      8-78               

   





[facility] HospitalLeukocytes [#/volume] in Blood by Automated 
pezhw2667-39-97 17:40:00* 



                      Test Item  Value      Reference Range Interpretation Comme

[facility]

 

                                                    White Blood Count (test code

 = 

6690-2)         9.9 10x3/uL     4.8-10.8                        





[facility] HospitalBlood erythrocytes automated count 
(number/volume)2023 17:40:00* 



                      Test Item  Value      Reference Range Interpretation Comme

[facility]

 

                                                    Red Blood Count (test code =

 

789-8)          3.82 mill/uL    4.20-5.40                       





[facility] HospitalBlood hemoglobin measurement (mass/volume)
2023 17:40:00* 



                      Test Item  Value      Reference Range Interpretation Comme

[facility]

 

                      Hemoglobin (test code = 718-7) 11.3 g/dL  12.0-16.0       

      





[facility] HospitalAutomated erythrocyte mean corpuscular volume
2023 17:40:00* 



                      Test Item  Value      Reference Range Interpretation Comme

[facility]

 

                                                    Mean Corpuscular Volume (arjun

t code = 

787-2)          92.2 fl         78.0-98.0                       





[facility] HospitalAutomated erythrocyte mean corpuscular 
hemoglobin (mass per erythrocyte)2023 17:40:00* 



                      Test Item  Value      Reference Range Interpretation Comme

[facility]

 

                                                    Mean Corpuscular Hemoglobin 

(test 

code = 785-6)   29.7 pg         27.0-31.0                       





[facility] HospitalAutomated erythrocyte mean corpuscular 
hemoglobin concentration measurement (mass/qeu8778-17-58 17:40:00* 



                      Test Item  Value      Reference Range Interpretation Comme

[facility]

 

                                                    Mean Corpuscular Hemoglobin 

Concent 

(test code = 786-4) 32.2 g/dL       32.0-36.0                       





[facility] HospitalAutomated erythrocyte distribution width ratio
2023 17:40:00* 



                      Test Item  Value      Reference Range Interpretation Comme

[facility]

 

                                                    Red Cell Distribution Width 

(test code 

= 788-0)        12.5 %          11.5-14.5                       





[facility] HospitalAutomated blood platelet count (count/volume)
2023 17:40:00* 



                      Test Item  Value      Reference Range Interpretation Comme

[facility]

 

                                                    Platelet Count (test code = 

777-3)          332 10x3/uL     130-400                         





[facility] HospitalAutomated blood platelet mean [id number] 
17:40:00* 



                      Test Item  Value      Reference Range Interpretation Comme

nts

 

                                                    Mean Platelet Volume (test c

ode = 

43153-0)        9.5 fL          7.4-10.4                        





[facility] HospitalAutomated blood neutrophils/100 leukocytes
2023 17:40:00* 



                      Test Item  Value      Reference Range Interpretation Comme

[facility]

 

                      Neutrophils % (test code = 770-8) 69.8 %     42.0-75.0    

         





[facility] HospitalLymphocytes/100 leukocytes in Blood by Automated
 bsspc8270-29-92 17:40:00* 



                      Test Item  Value      Reference Range Interpretation Comme

[facility]

 

                      Lymphocytes % (test code = 736-9) 16.4 %     21.0-51.0    

         





[facility] HospitalAutomated blood monocytes/100 leukocytes
2023 17:40:00* 



                      Test Item  Value      Reference Range Interpretation Comme

[facility]

 

                      Monocytes % (test code = 5905-5) 11.0 %     0.0-10.0      

        





[facility] HospitalAutomated blood eosinophils/100 leukocytes
2023 17:40:00* 



                      Test Item  Value      Reference Range Interpretation Comme

[facility]

 

                      Eosinophils % (test code = 713-8) 1.5 %      0.0-10.0     

         





[facility] HospitalAutomated blood basophils/100 leukocytes
2023 17:40:00* 



                      Test Item  Value      Reference Range Interpretation Comme

[facility]

 

                      Basophils % (test code = 706-2) 1.4 %      0.0-1.0        

       





[facility] HospitalBlood neutrophils automated count 
(number/volume)2023 17:40:00* 



                      Test Item  Value      Reference Range Interpretation Comme

nts

 

                      Neutrophils # (test code = 751-8) 6.9 thou/uL 1.40-6.50   

          





[facility] Throat HospitalLymphocytes [#/volume] in Blood by Automated 
gvhsf8998-01-27 17:40:00* 



                      Test Item  Value      Reference Range Interpretation Comme

nts

 

                      Lymphocytes # (test code = 731-0) 1.6 thou/uL 1.20-3.40   

          





[facility] HospitalBlMur[facility] monocytes automated count (number/volume)
2023 17:40:00* 



                      Test Item  Value      Reference Range Interpretation Comme

[facility]

 

                      Monocytes # (test code = 742-7) 1.1 thou/uL 0.11-0.59     

        





[facility] HospitalBlood eosinophils automated count (count/volume)
2023 17:40:00* 



                      Test Item  Value      Reference Range Interpretation Comme

[facility]

 

                      Eosinophils # (test code = 711-2) 0.1 thou/uL 0.0-0.7     

          





[facility] HospitalAutomated blood basophil count (count/volume)
2023 17:40:00* 



                      Test Item  Value      Reference Range Interpretation Comme

[facility]

 

                      Basophils # (test code = 704-7) 0.1 thou/uL 0.0-0.2       

        





[facility] CareGlucose Glucquwqnwh5732-50-15 20:10:00* 



                      Test Item  Value      Reference Range Interpretation Comme

[facility]

 

                                                    Glucose Fingerstick 

(test code = WGLUC) 94 mg/dL                                   Skyler Bojorquez RN or MD





Hemoglobin and Eneqzxhpuy0707-73-62 07:45:00* 



                      Test Item  Value      Reference Range Interpretation Comme

[facility]

 

                      Hemoglobin (test code = HGBT) 10.5 g/dL  12.2-14.8  L     

     

 

                      Hematocrit (test code = HCTT) 33.5 %     36.5-44.4  L     

     





Basic Metabolic [id number] 07:45:00* 



                      Test Item  Value      Reference Range Interpretation Comme

[facility]

 

                                                    SODIUM (test code = 

NA)             140.0 mmol/L    136.0-145.0     N               

 

                                                    Potassium,K (test 

code = K)       4.3 mmol/L      3.0-5.1         N               

 

                                                    Chloride (test code 

= CL)           108 mmol/L                H               

 

                                                    Carbon Dioxide (test 

code = CO2)     26 mmol/L       20-31           N               

 

                                                    Anion Gap (test code 

= GAP)          6 mmol/L        5-15            N               

 

                                                    Blood Urea Nitrogen 

(test code = BUN) 10 mg/dL        9-23            N               

 

                                                    Creatinine (test 

code = CREATT)  0.72 mg/dL      0.55-1.02       N               

 

                                                    Creatinine Clr Calc 

Pharmacy (test code 

= CRCLPHA)      80.70 mL/min                                    

 

                                                    Estimated Glomerular 

Filt Rate (test code 

= EGFR.XX)      97              See_Comment                     Reported eGFR is

 

based on the CKD-EPI 

 equation 

thatdoes not use a 

race coefficient. 

Additional 

information canbe 

found 

[id number]

[email or web address] 

([email or web address]) 

[Automated message] 

The system which 

generated this result 

transmitted reference 

range: >=90 

ml/min/1.73m2. The 

reference range was 

not used to interpret 

this result as 

normal/abnormal.

 

                                                    BUN/Creatinine Ratio 

(test code = 

BCRATIO)        14 ratio        10-20           N               

 

                                                    Glucose (test code = 

GLU)            87 mg/dL                  N               

 

                                                    Osmolality,Calculate

d (test code = 

OSMOC)          287.5                                           

 

                                                    Calcium (test code = 

CA)             8.6 mg/dL       8.3-10.6        N               





Glucose [id number] 21:47:00* 



                      Test Item  Value      Reference Range Interpretation Comme

nts

 

                                                    Glucose Fingerstick 

(test code = WGLUC) 182 mg/dL                                  Skyler Bojorquez RN or MD





XR voiding cystourethrogrm (p)[place] [id number]
[place] [address] [phone]  Patient Name: 
Lilliana Saunders Medical Record#: [id number] Address: [address] [place] [address] 
Account#: [id number] City/State/Zip: [place] [address] Attending Dr: Nicole Gomez MD Phone: [phone]: [employer] BLUE ADVANTAGE EXC HANGE 
/Age/Sex: 1965/58/F Self Pay Admit/Reg Date: 23 Ordering Dr: 
Faraz Garcia MD [facility] P[facility]: Segun Crawford NP  Date of 
Service: 23 Order (s): XR voiding cystourethrogrm (p)  CPT Code: 73913 
Report Number: [id number] Reason for Exam: h/o colovessicle fistula Clinical 
history: Colovesical fistula. Location: R 16. FINDINGS: Following administration
of the risks, benefits, and alternatives to the procedure, the patient gave oral
and written consent. Then, using sterile technique, a total of 150 mL contrast 
was instilled into the bladder under fluoroscopy. The bladder demonstrates a 
normal configuration. No vesicoureteral reflux is noted during the course of 
this examination. There is no evidence of fistula. There is complete emptying of
the bladder on the postvoid image. A total of 1.13 minutes fluoroscopy time is 
utilized. Reference air kerma is 39.1 mGy. IMPRESSION: 1. No abnormalities are 
identified. Electronically signed by: Yvette Alonzo MD 2023 3:12 PM CDT 
Workstation: [id number]  Dictated By: Yvette Alonzo MD 23 142 Signed 
By: Yvette Alonzo MD 23 1514  TD/TT: 23 Tech: [id number] cc: 
[id number]; [id number]* Faraz Garcia MD; Segun Crawford. NlXR small bowel 
[facility] [address] [place] [address] [phone] Patient Name: Lilliana Saunders Medical 
Record#: [id number] Address: [address] [place] [address] Account#: [id number] 
City/State/Zip: [place] [address] Attending Dr: Nicole Gomez MD Phone: 
[phone]: [employer] BLUE ADVANTAGE EXC HANGE /Age/Sex: 
1965/58/F Self Pay  Admit/Reg Date: 23 Ordering Dr: Faraz Garcia MD Location: [facility]E  PCP: Segun Crawford NP Date of Service: 23
 Order (s): XR small bowel follow thru (SB CPT Code: 02629 Report Number: 
[id number] Reason for Exam: abdominal distention EXAMINATION: XR small bowel 
follow thru (SB CLINICAL INDICATION: Female, 58 years old with abdominal 
distention TECHNIQUE:  KUB is performed. Patient was subsequently 
administered oral contrast, serial KUB examinations are performed until contrast
reaches the colon. Spot images are obtained as needed.  COMPARISON: None TOTAL 
FLUOROSCOPY TIME: 0 secondsTOTAL NUMBER OF IMAGES: 7 FINDINGS:  KUB 
demonstrates mild gaseous distention of the stomach and small bowel. Oral 
contrast was administered, opacifying the stomach. The duodenal sweep is unremar
kable. There is prominence of thejejunum with mild fold thickening. Distal small
bowel is visualized and appears to be of normal caliber, slightly featureless. 
The colon is visualized at 5 hours, slightly delayed bowel transit. IMPRESSION: 
Colon is visualized at 5 hours suggesting that there is slight delay in transit 
time. Proximal jejunum is distended, mild wall thickening. Electronically signed
by: Vinh Nair MD 2023 6:10 PM CDT Workstation: [id number]  Dictated By:
Vinh Nair MD 23 Signed By: Vinh Nair MD 23  
TD/TT: 23 Tech: PTN01 cc: [id number]; [id number]* Faraz Garcia MD; 
Segun Crawford. NlCT Abdomen Pelvis WO Con
************************************************************[facility] : 1965 Sex: 
F************************************************************[facility] Pt Name: LILLIANA SAUNDERS [address] Phys: [place] [address] : 1965 Age: 58 SEX:F [phone]  Exam Date:
23 Status: REG ER Acct: [id number] Loc: [facility] Pt Unit #: [id number] 
Report #: [id number] CC: Sandie Lucio MD PULSECHECKSJX:[id number] CAT SCAN 
REPORT Report Status: Signed Order # Category/Exam [id number] CT/CT Abdomen 
Pelvis WO Con ([id number]): . Results CT Abdomen Pelvis WO Con: 7/3/2023 8:35 PM
HISTORY: Small bowel obstruction. COMPARISON: None. TECHNIQUE: Multiple 
contiguous axial images were obtained and a CT of the abdomen and pelvis without
IV contrast. Coronal and sagittal reformats were performed. FINDINGS: This 
examination is limited for the evaluation of solid organs and vascular structure
s due to the lack of intravenous contrast. Lower Chest: Bibasilar subsegmental 
atelectasis and small bilateral effusions. Abdomen: Liver: within normal limits.
Gallbladder: Surgically absent Bile Ducts: Normal caliber.  Pancreas: within 
normal limits. Spleen: within normal limits. Adrenals: within normal limits. 
Kidneys: within normal limits. Pelvis: Reproductive Organs: No pelvic masses. 
Ureters: within normal limits. Bladder: Hutchinson catheter within the urinary 
bladder. Bowel: Dilated small bowel loops with air-fluid levels. Transition 
point not clearly delineated. Anastomotic suture lines are seen in the sigmoid. 
Mesenteric Lymph Nodes: No enlarged mesenteric lymph nodes. Peritoneum: Mild p
elvic free fluid and generalized mesenteric stranding. Vessels: Atherosclerotic 
calcifications in the aorta Retroperitoneum: within normal limits. Abdominal 
Wall: within normal limits. Bones: Unremarkable. IMPRESSION: 1. Dilated small 
bowel loops with air-fluid levels representing ileus versus obstruction. 2. 
Transition point not clearly delineated. Reported By: Edd Ritchie 
Electronically Signed: 7/3/2023 9:01 PM  Reported By: Edd Ritchie DO 
Electronically Signed Date/Time: 23 Technologist: ABDIAZIZ Dictated 
Date/Time: 23 Transcribed Date/Time:XR Abdomen 2 View/1 View Cxr
************************************************************[facility] : 1965 Sex: 
F************************************************************[facility] Pt Name: LILLIANA SAUNDERS [address] Phys: [place] [address]  : 1965 Age: 58 SEX:F [phone] Exam Date:
23 Status: REG ER Acct: [id number] Loc: [facility] Pt Unit #: [id number] 
Report #: [id number] CC: Sandie Lucio MD PULSECHECKSJX:[id number] [facility] 
SERVICES REPORT Report Status: Signed Order # Category/Exam [id number] RAD/XR 
Abdomen 2 View/1 View Cxr ([id number]): . Results Exam: Single view of the chest
and 2 views of the abdomen HISTORY: Abdominal pain COMPARISON: None FINDINGS: 2 
views of the abdomen and asingle view the chest shows dilated small bowel loops 
throughout the abdomen with air-fluid levels on upright exam. Air is seen to the
level of the rectum. Surgical clips are seen throughout the abdomen and pelvis. 
The cardiomediastinal silhouette is normal in size. Scattered bilateral 
atelectasis and scarring are present. Calcifications are seen in the aorta. 
Osseous degenerative changes are noted. IMPRESSION: Dilated small bowel loops 
with air-fluid levels on upright exam representing small bowel obstruction 
versus ileus. Reported By: Edd Ritchie Electronically Signed: 7/3/2023 6:04 
PM Reported By: Edd Ritchie DO Electronically Signed Date/Time: 
23 Technologist: ABDIAZIZ Dictated Date/Time: 23 
Transcribed Date/Time:[facility]  [address] 
[place], [place] [address] [phone]  Patient Name: 
Lilliana Saunders Medical Record#: [id number] Address: [address] [place] [address] 
Account#: [id number] City/State/Zip: [place] [address] Attending Dr: Faraz CARLOS)
Jose RUTHERFORD Phone: [phone] Insurance: [employer]BS BLUE ADVANTAGE EXC HANGE 
/Age/Sex: 1965/58/F Self Pay Admit/Reg Date: 23 Ordering Dr: 
Nicole Gomez MD Location: [facility]5S/-W PCP: Segun Crawford NP  Date of 
Service: 23 Order (s): XR KUB  CPT Code: 74142 Report Number: [id number]
[id number] Reason for Exam: sbo EXAM: Abdominal x-ray, 1 view Dictation location: E5 
INDICATION: Small bowel obstruction COMPARISON: Abdominal radiographs on 
2023 DISCUSSION: A frontal view of the abdomen is submitted. Several mildly
dilated air-filled small bowel loops are noted. Bowel gas is present within the 
colonto the level of the rectum. No gross evidence for intra-abdominal free air 
is seen. Numerous abdominal surgical clips are noted. No acutebony abnormalities
are identified. IMPRESSION: Findings are consistent with ileus or partial small 
bowel obstruction. No gross evidence of intra-abdominal free air. Electronically
signed by: Pernell Apodaca MD 2023 8:57 AM CDT Workstation: [id number] 
Dictated By: Pernell Apodaca MD 23 0643  Signed By: Pernell Apodaca MD 
23 0859 TD/TT: 643 Tech:   cc: [id number]; [id number]* Segun Crawford. Nl; CHELSI Santos. [address] [address] [phone]  Patient 
Name: Lilliana Saunders Medical Record#: [id number] Address: [address] [place] [address] Account#: [id number] City/State/Zip: [place] [address] Attending Dr: Faraz CARLOS) Jose RUTHERFORD Phone: [phone] Insurance: [employer]BS BLUE ADVANTAGE EXC HANGE 
/Age/Sex: 1965/58/F Self Pay Admit/Reg Date: 23 Ordering Dr: 
Nicole Gomez MD Location: [facility]/-P PCP: Segun Crawford NP  Date of 
Service: 23 Order (s): XR KUB  CPT Code: 57761 Report Number: [id number]
[id number] Reason for Exam: ileus vs SBO Abdomen (KUB) History: ileus vs SBO 
Comparison:  the same day Location: H45 Number of images: 2 
There are dilated air-filled loops of small bowel. The bones appear unchanged. 
No pathologic calcifications are identified. IMPRESSION: The previously 
identified nasogastric tube is no longer demonstrated on the current images. 
There continue to be dilated air-filled loops of small bowel, which could be due
to a small bowel obstruction. Electronically signed by: Jonnie Schultz MD 
2023 5:44 PM CDT Workstation: [id number] Dictated By: Jonnie Schultz MD 
23 Signed By: Jonnie Schultz MD 23  TD/TT: 23 
Tech: LCA06 cc: [id number]; [id number]* Segun Crawford. Nl; Nicole Gomez MD
XR KUBSt. [address] [address] [phone] Patient Name: Lilliana Saunders Medical Record#:
[id number] Address: [address] [place] [address] Account#: [id number] City/State/Zip:
[place] [address] Attending Dr: Faraz CARLOS) Jose RUTHERFORD Phone: [phone] 
Insurance: BCBS [facility] /Age/Sex: 1965/58/F Self Pay 
Admit/Reg Date: 23 Ordering Dr: Waldo Arciniega MD Location: [facility]5S/-R
PCP: Segun Crawford NP Dateof Service: 23  Order (s): XR KUB CPT Code:
48722 Report Number: [id number] Reason for Exam: NGT Location: H3 KUB of the 
abdomen, 2023 CLINICAL HISTORY: Placement of nasogastric feedingtube 
COMPARISON EXAM: None relevant to this exam A nasogastric feeding tube is seen 
with the tip inthe body of the stomach. The port of the nasogastric tube appears
situated distal to the esophagogastric junction. There is no pneumoperitoneum. 
There is preferential small bowel obstruction and concern for a distal small 
bowelobstruction. Multiple surgical clips are identified. Electronically signed 
by: Anita Escudero MD 2023 5:29 AM CDT Workstation: [id number] 
Dictated By: Rachael Escudero MD 23  Signed By: Anita Patton MD 2331 TD/TT: 23 Tech:   cc: 
[id number]; [id number]* Segun Crawford. Nl; Waldo Arciniega MD

## 2024-08-11 NOTE — ER
Nurse's Notes                                                                                     

 [facility]                                                                 

Name: Lilliana Coombs                                                                          

Age: 59 yrs                                                                                       

Sex: Female                                                                                       

: 1965                                                                                   

MRN: [id number]                                                                                   

Arrival Date: 2024                                                                          

Time: 16:48                                                                                       

Account#: [id number]                                                                            

Bed 8                                                                                             

Private MD:                                                                                       

Diagnosis: UTI/ Urinary tract infection, site not specified                                       

                                                                                                  

Presentation:                                                                                     

                                                                                             

16:57 Chief complaint: Patient states: Dysuria for a couple days, diagnosed with a UTI        nj1 

      Thursday. Call from PCP today, told to come to ED for IV antibiotics due to multiple        

      allergies to medications. Coronavirus screen: Vaccine status: Patient reports receiving     

      the 2nd dose of the covid vaccine. Ebola Screen: Patient denies travel to an                

      Ebola-affected area in the 21 days before illness onset. Initial Sepsis Screen: Does        

      the patient meet any 2 criteria? No. Patient's initial sepsis screen is negative. Does      

      the patient have a suspected source of infection? No. Patient's initial sepsis screen       

      is negative. Risk Assessment: Do you want to hurt yourself or someone else? Patient         

      reports no desire to harm self or others. Onset of symptoms was 2024.                

16:57 Method Of Arrival: Ambulatory                                                           [facility] 

16:57 Acuity: LAINE 3                                                                           nj1 

                                                                                                  

Triage Assessment:                                                                                

17:20 General: Appears in no apparent distress. Behavior is calm, cooperative.                dd2 

                                                                                                  

Historical:                                                                                       

- Allergies:                                                                                      

17:01 Ciprofloxacin;                                                                          nj1 

17:01 Nitrofurantoin;                                                                         nj1 

17:01 Macrobid;                                                                               nj1 

17:01 Azithromycin;                                                                           [facility] 

17:01 ondansetron;                                                                            nj1 

- PMHx:                                                                                           

17:01 Uterine CA; GERD;                                                                       nj 

- PSHx:                                                                                           

17:01 Cholecystectomy; Hysterectomy;                                                          nj1 

                                                                                                  

- Immunization history:: Client reports receiving the 2nd dose of the Covid vaccine.              

- Infectious Disease History:: Denies.                                                            

- Social history:: Smoking status: Patient reports the use of cigarette tobacco                   

  products, smokes one pack cigarettes per day.                                                   

                                                                                                  

                                                                                                  

Screenin:53 [facility] ED Fall Risk Assessment (Adult) History of falling in the last 3 months,       dd2 

      including since admission No falls in past 3 months (0 pts) Confusion or Disorientation     

      No (0 pts) Intoxicated or Sedated No (0 pts) Impaired Gait No (0 pts) Mobility Assist       

      Device Used No (0 pt) Altered Elimination No (0 pt) Score/Fall Risk Level 0 - 2 = Low       

      Risk Oriented to surroundings, Maintained a safe environment, Hourly rounding (assess       

      needs \T\ fall precautionary measures) done. Abuse screen: Denies threats or abuse.         

      Nutritional screening: No deficits noted. Tuberculosis screening: No symptoms or risk       

      factors identified.                                                                         

                                                                                                  

Assessment:                                                                                       

17:53 General: Appears in no apparent distress. Behavior is calm, cooperative. Pain: Denies   dd2 

      pain. Neuro: No deficits noted. Cardiovascular: No deficits noted. Respiratory: No          

      deficits noted. GI: No deficits noted. : Reports burning with urination. EENT: No         

      deficits noted. Derm: No deficits noted. Musculoskeletal: No deficits noted.                

19:21 Reassessment: Patient appears in no apparent distress at this time. Patient and/or      bm8 

      family updated on plan of care and expected duration. Pain level reassessed. Patient is     

      alert, oriented x 3, equal unlabored respirations, skin warm/dry/pink. Patient denies       

      pain at this time. Patient states feeling better.                                           

                                                                                                  

Vital Signs:                                                                                      

16:57  / 75; Pulse 70; Resp 16; Temp 99; Pulse Ox 98% ; Weight 72.57 kg; Height 5 ft. 6 nj1 

      in. ;                                                                                       

17:53  / 64; Pulse 61; Resp 15; Pulse Ox 97% ;                                          dd2 

19:21  / 72; Pulse 62; Resp 16; Temp 99; Pulse Ox 100% ; Pain 0/10;                     bm8 

16:57 Body Mass Index 25.82 (72.57 kg, 167.64 cm)                                             nj1 

19:21 Pain Scale: Adult                                                                       bm8 

                                                                                                  

Lan Coma Score:                                                                               

19:21 Eye Response: spontaneous(4). Motor Response: obeys commands(6). Verbal Response:       bm8 

      oriented(5). Total: 15.                                                                     

                                                                                                  

ED Course:                                                                                        

16:51 Patient arrived in ED.                                                                  mr  

16:58 [facility]Amanda [facility]-C [facility]                                                        kb  

16:58 Mratir Aguirre MD is Attending Physician.                                                kb  

17:01 Triage completed.                                                                       nj1 

17:04 Arm band placed on left wrist.                                                          nj1 

17:40 SEBLE HAYNES, SAE is Primary Nurse.                                                      dd2 

17:53 Patient has correct armband on for positive identification. Bed in low position. Call   dd2 

      light in reach. Side rails up X 1. Provided Education on: CALL LIGHT, IV, LABS. Door        

      closed. Warm blanket given.                                                                 

17:53 Client placed on continuous cardiac and pulse oximetry monitoring. NIBP monitoring      dd2 

      applied.                                                                                    

17:53 Urinalysis w/ reflexes Sent.                                                            dd2 

17:53 BMP Sent.                                                                               dd2 

17:53 CBC with Diff Sent.                                                                     dd2 

17:53 No provider procedures requiring assistance completed. Urine collected: clean catch     dd2 

      specimen, cloudy. Inserted saline lock: 20 gauge in left antecubital area, using            

      aseptic technique. Blood collected. Flushed with 10 mL NS.                                  

19:21 IV discontinued, intact, bleeding controlled, No redness/swelling at site. Pressure     bm8 

      dressing applied.                                                                           

                                                                                                  

Administered Medications:                                                                         

18:46 Drug: Rocephin IV 1 grams IV at calculated rate once; Given slow IV push per pharmacy   dd2 

      instructions Route: IV; Rate: calculated rate; Site: left antecubital;                      

19:22 Follow up: Response: No adverse reaction; IV Status: Completed infusion; IV Intake: [id number] 

                                                                                                  

                                                                                                  

Medication:                                                                                       

17:53 VIS not applicable for this client.                                                     dd2 

                                                                                                  

Intake:                                                                                           

19:22 IV: 50ml; Total: 50ml.                                                                  bm8 

                                                                                                  

Outcome:                                                                                          

19:10 Discharge ordered by MD.                                                                malu  

19:21 Discharged to home ambulatory,                                                          bm8 

19:21 Condition: stable                                                                           

19:21 Discharge instructions given to patient, Instructed on discharge instructions, follow       

      up and referral plans. safety practices, Demonstrated understanding of instructions,        

      follow-up care, medications, Prescriptions given X 1,                                       

19:23 Patient left the ED.                                                                    bm8 

                                                                                                  

Signatures:                                                                                       

Amanda Beltrán, FNP-C                 FNP-Ckb                                                   

Nellie Palafox, Reg                       Reg  mr                                                   

Juliette Bartlett, SAE                         RN   nj1                                                  

Robert Kapoor RN RN   bm8                                                  

SEBLE HAYNES RN                        RN   dd2                                                  

                                                                                                  

**************************************************************************************************

## 2024-08-11 NOTE — EDPHYS
Physician Documentation                                                                           

 [facility]                                                                 

Name: Lilliana Coombs                                                                          

Age: 59 yrs                                                                                       

Sex: Female                                                                                       

: 1965                                                                                   

MRN: [id number]                                                                                   

Arrival Date: 2024                                                                          

Time: 16:48                                                                                       

Account#: [id number]                                                                            

Bed 8                                                                                             

Private MD:                                                                                       

ED Physician Martir Aguirre                                                                         

HPI:                                                                                              

                                                                                             

17:10 This 59 yrs old  Female presents to ER via Ambulatory with complaints of        kb  

      Urinary Problem.                                                                            

17:10 Pt is a 59 year old female who presents for urinary frequency, urinating small amounts  kb  

      and dysuria that started 7 days ago. Denies fever, chills, flank pain, abd pain. States     

      she was seen by PCP on Thursday and started on Augmentin for a UTI. States she got a        

      call today about her urine culture results and was told to come to the ER for IV            

      antibiotics because she is allergic to the other oral antibiotics that the bacteria is      

      sensitive to. .                                                                             

                                                                                                  

Historical:                                                                                       

- Allergies:                                                                                      

17:01 Ciprofloxacin;                                                                          nj1 

17:01 Nitrofurantoin;                                                                         nj1 

17:01 Macrobid;                                                                               nj1 

17:01 Azithromycin;                                                                           nj1 

17:01 ondansetron;                                                                            nj1 

- PMHx:                                                                                           

17:01 Uterine CA; GERD;                                                                       nj1 

- PSHx:                                                                                           

17:01 Cholecystectomy; Hysterectomy;                                                          nj1 

                                                                                                  

- Immunization history:: Client reports receiving the 2nd dose of the Covid vaccine.              

- Infectious Disease History:: Denies.                                                            

- Social history:: Smoking status: Patient reports the use of cigarette tobacco                   

  products, smokes one pack cigarettes per day.                                                   

                                                                                                  

                                                                                                  

ROS:                                                                                              

17:56 Constitutional: As per HPI                                                              kb  

                                                                                                  

Exam:                                                                                             

17:56 Constitutional:  This is a well developed, well nourished patient who is awake, alert,  kb  

      and in no acute distress. Head/Face:  Normocephalic, atraumatic. ENT:  Moist Mucous         

      membranes Cardiovascular:  Regular rate  Respiratory:  Respirations even and unlabored.     

      No increased work of breathing. Talking in full sentences Abdomen/GI:  Soft,                

      non-tender. No distention Back:  No spinal tenderness.  No costovertebral tenderness.       

      Full range of motion. Skin:  Warm, dry with normal turgor.  Normal color. MS/               

      Extremity:  Pulses equal, no cyanosis.  Neurovascular intact.  Full, normal range of        

      motion. Neuro:  Awake and alert, GCS 15, oriented to person, place, time, and               

      situation. Moves all extremities. Normal gait.                                              

                                                                                                  

Vital Signs:                                                                                      

16:57  / 75; Pulse 70; Resp 16; Temp 99; Pulse Ox 98% ; Weight 72.57 kg; Height 5 ft. 6 nj1 

      in. ;                                                                                       

17:53  / 64; Pulse 61; Resp 15; Pulse Ox 97% ;                                          dd2 

19:21  / 72; Pulse 62; Resp 16; Temp 99; Pulse Ox 100% ; Pain 0/10;                     bm8 

16:57 Body Mass Index 25.82 (72.57 kg, 167.64 cm)                                             nj1 

19:21 Pain Scale: Adult                                                                       bm8 

                                                                                                  

[place] Coma Score:                                                                               

19:21 Eye Response: spontaneous(4). Motor Response: obeys commands(6). Verbal Response:       bm8 

      oriented(5). Total: 15.                                                                     

                                                                                                  

MDM:                                                                                              

16:59 Patient medically screened.                                                               

17:56 Data reviewed: vital signs, nurses notes. External Records Reviewed: Outpatient labs:     

      urine culture and sensitivity report from PCP reviewed on pt's phone. .                     

19:08 Differential diagnosis: UTI, pyelonephritis, kidney stone. Test considered but Not      kb  

      performed: CT: ct considered but pt has no abd or CVA tenderness, afebrile, nontoxic in     

      appearance. Counseling: I had a detailed discussion with the patient and/or guardian        

      regarding the historical points, exam findings, and any diagnostic results supporting       

      the discharge/admit diagnosis, lab results, the need for outpatient follow up, a family     

      practitioner, to return to the emergency department if symptoms worsen or persist or if     

      there are any questions or concerns that arise at home.                                     

19:10 I considered the following discharge prescriptions or medication management in the        

      emergency department Discussed results with pt. Due to pt's symptoms and positive           

      culture report that was resulted today I will prescribe antibiotics. .                      

                                                                                                  

                                                                                             

17:12 Order name: CBC with Diff; Complete Time: 18:07                                         kb  

                                                                                             

17:12 Order name: BMP; Complete Time: 18:28                                                   kb  

                                                                                             

17:12 Order name: Urinalysis w/ reflexes; Complete Time: 18:07                                kb  

                                                                                             

17:12 Order name: IV Start; Complete Time: 17:53                                              kb  

                                                                                                  

Administered Medications:                                                                         

18:46 Drug: Rocephin IV 1 grams IV at calculated rate once; Given slow IV push per pharmacy   dd2 

      instructions Route: IV; Rate: calculated rate; Site: left antecubital;                      

19:22 Follow up: Response: No adverse reaction; IV Status: Completed infusion; IV Intake: [id number] 

                                                                                                  

                                                                                                  

Disposition:                                                                                      

                                                                                             

08:41 Co-signature as Attending Physician, Martir Aguirre MD I reviewed the patient's care       rn  

      provided by the Advanced Practice Provider and agree with the diagnosis and treatment       

      plan.                                                                                       

                                                                                                  

Disposition Summary:                                                                              

24 19:10                                                                                    

Discharge Ordered                                                                                 

 Notes:       Location: Home                                                                        
  kb

      Condition: Stable                                                                       kb  

      Diagnosis                                                                                   

        - UTI/ Urinary tract infection, site not specified                                    kb  

      Followup:                                                                               kb  

        - With: Emergency Department                                                               

        - When: As needed                                                                          

        - Reason: Worsening of condition                                                           

      Followup:                                                                               kb  

        - With: Private Physician                                                                  

        - When: 2 - 3 days                                                                         

        - Reason: Recheck today's complaints, Continuance of care, Re-evaluation by your           

      physician                                                                                   

      Discharge Instructions:                                                                     

        - Discharge Summary Sheet                                                             kb  

        - Urinary Tract Infection, Adult, Easy-to-Read                                        kb  

      Forms:                                                                                      

        - Medication Reconciliation Form                                                      kb  

        - Antibiotic Education                                                                kb  

        - Prescription Opioid Use                                                             kb  

        - Patient Portal Instructions                                                         kb  

        - Leadership Thank You Letter                                                         kb  

      Prescriptions:                                                                              

        - cefpodoxime 100 mg Oral Tablet                                                           

            - take 1 tablet ORAL route every 12 hours for 10 days take with food; 20 tablet;  kb  

      Refills: 0, Product Selection Permitted                                                     

Signatures:                                                                                       

Dispatcher [employer]                           EDMS                                                 

Amanda Beltrán, FNP-C                 FNP-Ckb                                                   

Martir Aguirre MD MD rn Jaco, Norma RN                         RN   nj1                                                  

SEBLE HAYNES RN                        RN   dd2                                                  

Robert Kapoor RN   bm8                                                  

                                                                                                  

Corrections: (The following items were deleted from the chart)                                    

                                                                                             

17:12 17:12 CBC+H.LAB.BRZ ordered. EDMS                                                       EDMS

17:12 17:12 BASIC METABOLIC PANEL+C.LAB.BRZ ordered. EDMS                                     EDMS

17:12 17:12 Urinalysis+U.LAB.BRZ ordered. EDMS                                                EDMS

                                                                                                  

**************************************************************************************************

## 2024-09-15 NOTE — ER
Nurse's Notes                                                                                     

 [facility]                                                                 

Name: Lilliana Coombs                                                                          

Age: 59 yrs                                                                                       

Sex: Female                                                                                       

: 1965                                                                                   

MRN: [id number]                                                                                   

Arrival Date: 09/15/2024                                                                          

Time: 19:08                                                                                       

Account#: [id number]                                                                            

Bed 6                                                                                             

Private MD:                                                                                       

Diagnosis: Tinea pedis;Cellulitis to right fifth toe                                              

                                                                                                  

Presentation:                                                                                     

09/15                                                                                             

19:19 Chief complaint: Patient states: Pt states she developed a blister between right 4th    tl4 

      and 5th toe that popped and started to spread. Pt being treated with antifungal meds        

      prescribed by [facility]. Pt states area is getting worse and more painful.              

      Coronavirus screen: At this time, the client does not indicate any symptoms associated      

      with coronavirus-19. Ebola Screen: No symptoms or risks identified at this time.            

      Initial Sepsis Screen: Does the patient meet any 2 criteria? No. Patient's initial          

      sepsis screen is negative. Does the patient have a suspected source of infection? No.       

      Patient's initial sepsis screen is negative. Risk Assessment: Do you want to hurt           

      yourself or someone else? Patient reports no desire to harm self or others. Onset of        

      symptoms was 2024.                                                            

19:19 Method Of Arrival: Ambulatory                                                           tl4 

19:19 Acuity: LAINE 3                                                                           tl4 

                                                                                                  

Triage Assessment:                                                                                

19:24 General: Appears in no apparent distress. Behavior is calm, cooperative. Pain:          tl4 

      Complains of pain in right foot. EENT: No signs and/or symptoms were reported regarding     

      the EENT system. Neuro: Level of Consciousness is awake, alert, obeys commands,             

      Oriented to person, place, time, situation. Cardiovascular: Capillary refill < 3            

      seconds Patient's skin is warm and dry. Respiratory: Airway is patent Respiratory           

      effort is even, unlabored, Respiratory pattern is regular, symmetrical. GI: No signs        

      and/or symptoms were reported involving the gastrointestinal system. : No signs           

      and/or symptoms were reported regarding the genitourinary system. Derm: Wound noted         

      right foot. Musculoskeletal: No signs and/or symptoms reported regarding the                

      musculoskeletal system.                                                                     

                                                                                                  

Historical:                                                                                       

- Allergies:                                                                                      

19:23 Azithromycin;                                                                           tl4 

19:23 Ciprofloxacin;                                                                          tl4 

19:23 Macrobid;                                                                               tl4 

19:23 Nitrofurantoin;                                                                         tl4 

19:23 ondansetron;                                                                            tl4 

- PMHx:                                                                                           

19:23 GERD; Uterine CA;                                                                       tl4 

- PSHx:                                                                                           

19:23 Cholecystectomy; hysterectomy;                                                          tl4 

                                                                                                  

- Immunization history:: Adult Immunizations unknown.                                             

- Infectious Disease History:: Denies.                                                            

- Social history:: Smoking status: Patient reports the use of cigarette tobacco                   

  products, smokes one-half pack cigarettes per day.                                              

- Family history:: not pertinent.                                                                 

                                                                                                  

                                                                                                  

Screenin:35 [facility] ED Fall Risk Assessment (Adult) History of falling in the last 3 months,       bm8 

      including since admission No falls in past 3 months (0 pts) Confusion or Disorientation     

      No (0 pts) Intoxicated or Sedated No (0 pts) Impaired Gait No (0 pts) Mobility Assist       

      Device Used No (0 pt) Altered Elimination No (0 pt) Score/Fall Risk Level 0 - 2 = Low       

      Risk Oriented to surroundings, Maintained a safe environment, Educated pt \T\ family on     

      fall prevention, incl call for assistance when getting out of bed, Assessed \T\             

      reinforced patient's understanding of fall precautions, Hourly rounding (assess needs \T\   

      fall precautionary measures) done, Used ambulatory aids as needed (educated on \T\          

      assisted with), Used gait belt as appropriate. Abuse screen: Denies threats or abuse.       

      Nutritional screening: No deficits noted. Tuberculosis screening: No symptoms or risk       

      factors identified.                                                                         

                                                                                                  

Assessment:                                                                                       

19:34 General: Appears in no apparent distress. comfortable, Behavior is calm, cooperative,   bm8 

      appropriate for age. Pain: Complains of pain in right foot Pain.                            

19:34 Neuro: No deficits noted. Cardiovascular: Denies chest pain, Capillary refill < 3       bm8 

      seconds Patient's skin is warm and dry. Respiratory: No deficits noted. Airway is           

      patent Respiratory effort is even, unlabored, Respiratory pattern is regular,               

      symmetrical. GI: No signs and/or symptoms were reported involving the gastrointestinal      

      system. : No signs and/or symptoms were reported regarding the genitourinary system.      

      EENT: No signs and/or symptoms were reported regarding the EENT system. Derm: Reports       

      burning, itching, pain peeling, in between 4th and fifth toe.                               

                                                                                                  

Vital Signs:                                                                                      

19:19  / 94; Pulse 74; Resp 20; Temp 98.6(TE); Pulse Ox 100% on R/A; Weight 70.76 kg;   tl4 

      Height 5 ft. 6 in. ; Pain 8/10;                                                             

19:19 Body Mass Index 25.18 (70.76 kg, 167.64 cm)                                             tl4 

19:19 Pain Scale: Adult                                                                       4 

                                                                                                  

ED Course:                                                                                        

19:13 Patient arrived in ED.                                                                  im  

19:14 Tez Merino MD is Attending Physician.                                            rt  

19:23 Triage completed.                                                                       tl4 

19:25 Arm band placed on left wrist.                                                          tl4 

19:33 Robert Kapoor, RN is Primary Nurse.                                                    bm8 

19:35 Patient has correct armband on for positive identification. Bed in low position. Call   bm8 

      light in reach. Side rails up X 1. Provided Education on: post er care. Client placed       

      on continuous cardiac and pulse oximetry monitoring. NIBP monitoring applied. Pulse ox      

      on. NIBP on. Door closed. Noise minimized. Pillow given. Verbal reassurance given. Head     

      of bed elevated.                                                                            

19:35 No provider procedures requiring assistance completed. Patient did not have IV access   bm8 

      during this emergency room visit. Patient maintains SpO2 saturation greater than 95% on     

      room air.                                                                                   

                                                                                                  

Administered Medications:                                                                         

19:44 Drug: Amoxicillin-Clavulanate  mg PO once Route: PO;                              bm8 

19:44 Follow up: Response: Medication Administered at Departure                               bm8 

                                                                                                  

                                                                                                  

Medication:                                                                                       

19:35 VIS not applicable for this client.                                                     bm8 

                                                                                                  

Outcome:                                                                                          

19:40 Discharge ordered by MD.                                                                rt  

19:45 Discharged to home ambulatory,                                                          bm8 

19:45 Condition: stable                                                                           

19:45 Discharge instructions given to patient, Instructed on discharge instructions, follow       

      up and referral plans. medication usage, Demonstrated understanding of instructions,        

      follow-up care, medications, Prescriptions given X 1,                                       

19:47 Patient left the ED.                                                                    bm8 

                                                                                                  

Signatures:                                                                                       

Tez Merino MD MD   rt                                                   

Valentina Ann                                                                                  

Joe Gunn RN RN   4                                                  

Robert Kapoor, RN                      RN   bm8                                                  

                                                                                                  

**************************************************************************************************

## 2024-09-15 NOTE — EDPHYS
Physician Documentation                                                                           

 [facility]                                                                 

Name: Lilliana Coombs                                                                          

Age: 59 yrs                                                                                       

Sex: Female                                                                                       

: 1965                                                                                   

MRN: [id number]                                                                                   

Arrival Date: 09/15/2024                                                                          

Time: 19:08                                                                                       

Account#: [id number]                                                                            

Bed 6                                                                                             

Private MD:                                                                                       

ED Physician Tez Merino                                                                     

HPI:                                                                                              

09/15                                                                                             

20:17 This 59 yrs old  Female presents to ER via Ambulatory with complaints of Toe    rt  

      problem.                                                                                    

20:17 Patient presents to the ED with redness, pain to the right fourth and fifth toes.       rt  

      Patient was seen at [facility] about 4 days ago, was prescribed clotrimazole, fluconazole      

      for tinea pedis. States that the redness is worsened today. Denies fever, chills, acute     

      complaints, symptoms are mild in severity, no other aggravating or alleviating factors.     

                                                                                                  

Historical:                                                                                       

- Allergies:                                                                                      

19:23 Azithromycin;                                                                           tl4 

19:23 Ciprofloxacin;                                                                          tl4 

19:23 Macrobid;                                                                               tl4 

19:23 Nitrofurantoin;                                                                         tl4 

19:23 ondansetron;                                                                            tl4 

- PMHx:                                                                                           

19:23 GERD; Uterine CA;                                                                       tl4 

- PSHx:                                                                                           

19:23 Cholecystectomy; hysterectomy;                                                          tl4 

                                                                                                  

- Immunization history:: Adult Immunizations unknown.                                             

- Infectious Disease History:: Denies.                                                            

- Social history:: Smoking status: Patient reports the use of cigarette tobacco                   

  products, smokes one-half pack cigarettes per day.                                              

- Family history:: not pertinent.                                                                 

                                                                                                  

                                                                                                  

ROS:                                                                                              

20:17 Constitutional: Negative for fever, chills, and weight loss, Cardiovascular: Negative   rt  

      for chest pain, palpitations, and edema, Respiratory: Negative for shortness of breath,     

      cough, wheezing, and pleuritic chest pain, Abdomen/GI: Negative for abdominal pain,         

      nausea, vomiting, diarrhea, and constipation, Neuro: Negative for headache, weakness,       

      numbness, tingling, and seizure,                                                            

20:17 Skin: Positive for Redness, blister,                                                        

                                                                                                  

Exam:                                                                                             

20:17 Constitutional:  This is a well developed, well nourished patient who is awake, alert,  rt  

      and in no acute distress. Head/Face:  Normocephalic, atraumatic. Neuro:  Awake and          

      alert, GCS 15, oriented to person, place, time, and situation.  Cranial nerves II-XII       

      grossly intact.  Motor strength 5/5 in all extremities.  Sensory grossly intact.            

      Cerebellar exam normal.  Normal gait.                                                       

20:17 Musculoskeletal/extremity: Mild erythema noted in between the fourth and fifth digits.      

      There is macerated skin at the area.  No purulence, malodorous discharge.  Pulses,          

      motor, sensation intact.                                                                    

                                                                                                  

Vital Signs:                                                                                      

19:19  / 94; Pulse 74; Resp 20; Temp 98.6(TE); Pulse Ox 100% on R/A; Weight 70.76 kg;   tl4 

      Height 5 ft. 6 in. ; Pain 8/10;                                                             

19:19 Body Mass Index 25.18 (70.76 kg, 167.64 cm)                                             tl4 

19:19 Pain Scale: Adult                                                                       tl4 

                                                                                                  

MDM:                                                                                              

19:30 Patient medically screened.                                                             rt  

20:17 Differential Diagnosis Tinea pedis, cellulitis, diabetes. Data reviewed: vital signs,   rt  

      nurses notes, lab test result(s). Test considered but Not performed: X-ray: Patient         

      with only minimal erythema. Very low suspicion for an osteomyelitis. Do not believe         

      that x-ray, lab work is indicated. Counseling: I had a detailed discussion with the         

      patient and/or guardian regarding the historical points, exam findings, and any             

      diagnostic results supporting the discharge/admit diagnosis, the need for outpatient        

      follow up.                                                                                  

                                                                                                  

Administered Medications:                                                                         

19:44 Drug: Amoxicillin-Clavulanate  mg PO once Route: PO;                              bm8 

19:44 Follow up: Response: Medication Administered at Departure                               8 

                                                                                                  

                                                                                                  

Disposition Summary:                                                                              

09/15/24 19:40                                                                                    

Discharge Ordered                                                                                 

 Notes:       Location: Home                                                                        
  rt

      Problem: new                                                                            rt  

      Symptoms: are unchanged                                                                 rt  

      Condition: Stable                                                                       rt  

      Diagnosis                                                                                   

        - Tinea pedis                                                                         rt  

        - Cellulitis to right fifth toe                                                       rt  

      Followup:                                                                               rt  

        - With: Private Physician                                                                  

        - When: 2 - 3 days                                                                         

        - Reason:                                                                                  

      Discharge Instructions:                                                                     

        - Discharge Summary Sheet                                                             rt  

        - Athlete's Foot                                                                      rt  

        - Cellulitis, Adult                                                                   rt  

      Forms:                                                                                      

        - Medication Reconciliation Form                                                      rt  

        - Antibiotic Education                                                                rt  

        - Prescription Opioid Use                                                             rt  

        - Patient Portal Instructions                                                         rt  

        - Leadership Thank You Letter                                                         rt  

        - Work release form                                                                   bm8 

      Prescriptions:                                                                              

        - Augmentin 875-125 mg Oral Tablet                                                         

            - take 1 tablet ORAL route every 12 hours for 10 days; 20 tablet; Refills: 0,     rt  

      Product Selection Permitted                                                                 

Signatures:                                                                                       

Tez Merino MD MD   rt                                                   

LogdaJoe noriega RN                       RN   tl4                                                  

Robert Kapoor RN                      RN   8                                                  

                                                                                                  

**************************************************************************************************

## 2024-09-15 NOTE — XMS REPORT
Continuity of Care Document



                         Created on: September 15, 2024





LILLIANA SAUNDERS

External Reference #: [id number]

: 1965

Sex: Female



Demographics





                                        Address             [address] [place]  [address]

 

                                        Home Phone          [phone]

 

                                        Work Phone          [phone]

 

                                        Mobile Phone        [phone]

 

                                        Email Address       [email or web address]

 

                                        Preferred Language  en

 

                                        Marital Status      Unknown

 

                                        Anabaptist Affiliation Unknown

 

                                        Race                Unknown

 

                                        Additional Race(s)  Unavailable

White

 

                                        Ethnic Group         or 





Author





                                        Name                Unknown

 

                                        Address             [address] [facility] [address]

[place]  [address]

 

                                        [facility] [place]

[facility]

 

                                        Address             [address]

[address]

[place]  [address]

 

                                        Phone               [phone]





Support





                          Name         Relationship Address      Phone

 

                                Lilliana Saunders Personal Relationship [id number] 

[address]  [id number] [phone]

 

                                Kamaljit Miller CHILD           [id number] 

[place]  [address]                 [phone]

 

                                1               KAMALJIT          [address] [place]  [address]                      Unavailable

 

                                2               Personal Relationship [address]

[place]  [address]                      Unavailable

 

                                KAMALJIT MILLER SN              [address]

[place]  [address]                     [phone]

 

                                1               KAMALJIT          [address]

[place]  [address]                     Unavailable

 

                                Unavailable     Personal Relationship [address]

[place]  [address]                      Unavailable

 

                          Jody Boonen Caregiver    Unknown      Unavailable

 

                          GAUDENCIO BARTON OTHERRELATIONSHIP Unknown      [phone]

 

                                [employer] [place]  [address]                         [phone]





Care Team Providers





                                Care Team Member Name Role            Phone

 

                                Segun France. Primary Care Physici

an Unavailable

 

                                Debi Lux Attending Clinician Unavaila

Nicole Urban Attending Clinician Unavailable

 

                                Faraz Garcia) Attending Clinician UnavailDEVORA Alberto Attending Clinician YVETTE White  Attending Clinician Unavailable

 

                                KEVEN XIE Attending Clinician Unavailab

le

 

                                [email or web address] Attending Clinician Unavaila

Sandie Amador   Attending Clinician Unavailable

 

                                [email or web address]_J Attending Clinician Unavailable

 

                                DIANE DORAN Attending Clinician Unavailabl

e

 

                                [facility]           Attending Clinician Unavailable

 

                                Devora Tirado MD Somogyi Attending Clinician [phone]

[phone]

 

                                Rayshawn Lira  Attending Clinician Unavailable

 

                                Anne Marie Boone Attending Clinician Unavailable

 

                                Nicole Gomez Admitting Clinician Unavailable

 

                                Faraz Garcia) Admitting Clinician Unavaila

ble

 

                                [email or web address] Admitting Clinician Unavaila

ble

 

                                GC_[facility]_J Admitting Clinician Unavailable







Payers





                    Payer Name Policy Type Policy Number Effective Date Expirati

on Date Source

 

                                                    Blue Cross Blue 

[place] 6            [id number]                           [facility]

 

                          BCBS O[place]     4            [id number] 2023 

00:00:00                                            

 

                                                    [place] 

[place] ([place]                         [id number]        2023 

00:00:00                                            

 

                          PHCS-IMAGINE 360 2            [id number]    2022 

00:00:00                                            

 

                                                    [place]: 

CAREFIRST - 

BLUECHOICE - 

OPEN ACCESS                             [id number]        2022 

00:00:00                                2022 

00:00:00                                

 

                                                    MULTIPLAN-GPA/PP

O                   2                   [id number]           2022 

00:00:00                                            







Problems





                                                    Condition 

Name                                    Condition 

Details                                 Condition 

Category                  Status                    Onset 

Date                                    Resolution 

Date                                    Last 

Treatment 

Date                                    Treating 

Clinician                 Comments                  Source

 

                                                    Abdominal 

pain                                    Abdominal 

Pain                Problem             Active               

00:00:

00                                                               [facility]

 

                                                    Vesicocoli

c fistula                               Vesicocoli

c Fistula           Problem             Active               

00:00:

00                                                               [facility]

 

                                        [id number]           Prediabete

s       Problem                                                 [facility]

 

                                        [id number]           Diverticul

osis    Problem                                                 [facility]

 

                                        [id number]            Acute 

gastroente

ritis   Problem                                                 [facility]

 

            Gallstones Gallstones Problem                                     Co

mmon 

[facility]

 

                                                    Gastroesop

hageal 

reflux 

disease Reflux  Problem                                                 [facility]

 

                                        [id number]           Gastropare

sis     Problem                                                 [facility]

 

                                        [id number]           Abdominal 

cramping Problem                                                 [facility]

 

            Cancer Cancer Problem                                     [facility]

 

                                                    Irritable 

bowel                                   Irritable 

bowel   Problem                                                 [facility]

 

                                        [id number]            Hyperchole

sterolemia Problem                                                 [facility] 

[facility]







Allergies, Adverse Reactions, Alerts





                                                    Allergy 

Name                                    Allergy 

Type            Status          Severity        Reaction(s)     Onset 

Date                                    Inactive 

Date                                    Treating 

Clinician                 Comments                  Source

 

                                                        Drug 

allergy Active          Unknown                                 [facility]







Social History





                    Social Habit Start Date Stop Date Quantity  Comments  Source

 

                    Sex Assigned At Birth                                       

  [facility]

 

                                                    History of Tobacco 

Use                                    Current Smoker              [facility]







                          Smoking Status Start Date   Stop Date    Source

 

                          Unknown if ever smoked                           [facility]

on [facility]

 

                          Current Smoker 2020 00:00:00              [facility]







Medications





                                                    Ordered 

Medication 

Name                                    Filled 

Medication 

Name                                    Start 

Date                                    Stop 

Date                                    Current 

Medication?                             Ordering 

Clinician       Indication      Dosage          Frequency       Signature 

(SIG)               Comments            Components          Source

 

                                                    Bactrim DS 

800-160 MG                              Bactrim DS 

800-160 MG                              2020-0

3-16 

00:00:

00                        No                                     1{table

t}                                                  Bactrim DS 

800-160 MG                                                  

 

                                                    xRocephin 1 

gm                                      xRocephin 1 

gm                                      2019-0

3-18 

00:00:

00            No                   1g                                 [facility]

 

                                                    albuterol 

sulfate HFA 

90 

mcg/actuati

on aerosol 

inhaler 

INHALE 2 

PUFFS INTO 

THE LUNGS 

EVERY 4 

HOURS AS 

NEEDED FOR 

WHEEZING                                albuterol 

sulfate HFA 

90 

mcg/actuati

on aerosol 

inhaler 

INHALE 2 

PUFFS INTO 

THE LUNGS 

EVERY 4 

HOURS AS 

NEEDED FOR 

WHEEZING                 No                                      albuterol 

sulfate 

HFA 90 

mcg/actuat

ion 

aerosol 

inhaler 

INHALE 2 

PUFFS INTO 

THE LUNGS 

EVERY 4 

HOURS AS 

NEEDED FOR 

WHEEZING                                                    [facility] 

Medical

 

                                                    amoxicillin 

500 

mg-potassiu

m 

clavulanate 

125 mg 

tablet TAKE 

1 TABLET BY 

MOUTH TWICE 

DAILY                                   amoxicillin 

500 

mg-potassiu

m 

clavulanate 

125 mg 

tablet TAKE 

1 TABLET BY 

MOUTH TWICE 

DAILY                   No                                      amoxicilli

n 500 

mg-potassi

um 

clavulanat

e 125 mg 

tablet 

TAKE 1 

TABLET BY 

MOUTH 

TWICE 

DAILY                                                       [facility] 

[facility]

 

                                                    amoxicillin 

875 

mg-potassiu

m 

clavulanate 

125 mg 

tablet TAKE 

1 TABLET BY 

MOUTH EVERY 

12 HOURS 

FOR 10 DAYS                             amoxicillin 

875 

mg-potassiu

m 

clavulanate 

125 mg 

tablet TAKE 

1 TABLET BY 

MOUTH EVERY 

12 HOURS 

FOR 10 DAYS                 No                                      amoxicilli

n 875 

mg-potassi

um 

clavulanat

e 125 mg 

tablet 

TAKE 1 

TABLET BY 

MOUTH 

EVERY 12 

HOURS FOR 

10 DAYS                                                     [facility] 

Medical

 

                                                    azithromyci

n 250 mg 

tablet TAKE 

2 TABLETS 

BY MOUTH 

FOR 1 DAY 

THEN TAKE 1 

TABLET BY 

MOUTH DAILY 

FOR 4 DAYS 

THEREAFTER                              azithromyci

n 250 mg 

tablet TAKE 

2 TABLETS 

BY MOUTH 

FOR 1 DAY 

THEN TAKE 1 

TABLET BY 

MOUTH DAILY 

FOR 4 DAYS 

THEREAFTER                 No                                      azithromyc

in 250 mg 

tablet 

TAKE 2 

TABLETS BY 

MOUTH FOR 

1 DAY THEN 

TAKE 1 

TABLET BY 

MOUTH 

DAILY FOR 

4 DAYS 

THEREAFTER                                                  [facility]

 

                                                    benzonatate 

100 mg 

capsule 

TAKE 1 

CAPSULE BY 

MOUTH EVERY 

8 HOURS AS 

NEEDED FOR 

COUGH                                   benzonatate 

100 mg 

capsule 

TAKE 1 

CAPSULE BY 

MOUTH EVERY 

8 HOURS AS 

NEEDED FOR 

COUGH                   No                                      benzonatat

e 100 mg 

capsule 

TAKE 1 

CAPSULE BY 

MOUTH 

EVERY 8 

HOURS AS 

NEEDED FOR 

COUGH                                                       [facility]

 

                                                    BinaxNOW 

COVID-19 Ag 

Self Test 

kit TEST AS 

DIRECTED 

TODAY                                   [facility] 

COVID-19 Ag 

Self Test 

kit TEST AS 

DIRECTED 

TODAY                   No                                      [facility] 

COVID-19 

Ag Self 

Test kit 

TEST AS 

DIRECTED 

TODAY                                                       [facility]

 

                                                    cefdinir 

300 mg 

capsule 

TAKE 1 

CAPSULE BY 

MOUTH TWICE 

DAILY FOR 

10 DAYS                                 cefdinir 

300 mg 

capsule 

TAKE 1 

CAPSULE BY 

MOUTH TWICE 

DAILY FOR 

10 DAYS                 No                                      cefdinir 

300 mg 

capsule 

TAKE 1 

CAPSULE BY 

MOUTH 

TWICE 

DAILY FOR 

10 DAYS                                                     [facility]

 

                                                    ciprofloxac

in 250 mg 

tablet TAKE 

1 TABLET BY 

MOUTH EVERY 

12 HOURS 

FOR 7 DAYS                              ciprofloxac

in 250 mg 

tablet TAKE 

1 TABLET BY 

MOUTH EVERY 

12 HOURS 

FOR 7 DAYS                 No                                      ciprofloxa

parker 250 mg 

tablet 

TAKE 1 

TABLET BY 

MOUTH 

EVERY 12 

HOURS FOR 

7 DAYS                                                      [facility]

 

                                                    ciprofloxac

in 500 mg 

tablet TAKE 

1 TABLET BY 

MOUTH TWICE 

DAILY                                   ciprofloxac

in 500 mg 

tablet TAKE 

1 TABLET BY 

MOUTH TWICE 

DAILY                   No                                      ciprofloxa

parker 500 mg 

tablet 

TAKE 1 

TABLET BY 

MOUTH 

TWICE 

DAILY                                                       [facility]

 

                                                    diclofenac 

1 % topical 

gel                                     diclofenac 

1 % topical 

gel                     No                                      diclofenac 

1 % 

topical 

gel                                                         [facility]

 

                                                    diclofenac 

sodium 75 

mg 

tablet,kassy

yed release                             diclofenac 

sodium 75 

mg 

tablet,kassy

yed release                 No                                      diclofenac 

sodium 75 

mg 

tablet,del

ayed 

release                                                     [facility]

 

                                                    methylpredn

isolone 4 

mg tablets 

in a dose 

pack FOLLOW 

PACKAGE 

DIRECTIONS                              methylpredn

isolone 4 

mg tablets 

in a dose 

pack FOLLOW 

PACKAGE 

DIRECTIONS                 No                                      methylpred

nisolone 4 

mg tablets 

in a dose 

pack 

FOLLOW 

PACKAGE 

DIRECTIONS                                                  [facility]

 

                                                    ondansetron 

HCl 4 mg 

tablet                                  ondansetron 

HCl 4 mg 

tablet                  No                                      ondansetro

n HCl 4 mg 

tablet                                                      [facility]

 

                                                    phenazopyri

dine 200 mg 

tablet TAKE 

1 TABLET BY 

MOUTH EVERY 

8 HOURS AS 

NEEDED FOR 

URINARY 

PROBLEMS 

FOR 3 DAYS                              phenazopyri

dine 200 mg 

tablet TAKE 

1 TABLET BY 

MOUTH EVERY 

8 HOURS AS 

NEEDED FOR 

URINARY 

PROBLEMS 

FOR 3 DAYS                 No                                      phenazopyr

idine 200 

mg tablet 

TAKE 1 

TABLET BY 

MOUTH 

EVERY 8 

HOURS AS 

NEEDED FOR 

URINARY 

PROBLEMS 

FOR 3 DAYS                                                  [facility]

 

                                                    sulfamethox

azole 800 

mg-trimetho

prim 160 mg 

tablet TAKE 

1 TABLET BY 

MOUTH EVERY 

DAY BEFORE 

INTERCOURSE                             sulfamethox

azole 800 

mg-trimetho

prim 160 mg 

tablet TAKE 

1 TABLET BY 

MOUTH EVERY 

DAY BEFORE 

INTERCOURSE                 No                                      sulfametho

xazole 800 

mg-trimeth

oprim 160 

mg tablet 

TAKE 1 

TABLET BY 

MOUTH 

EVERY DAY 

BEFORE 

INTERCOURS

E                                                           [facility]

 

                                                    albuterol 

sulfate HFA 

90 

mcg/actuati

on aerosol 

inhaler 

INHALE 2 

PUFFS INTO 

THE LUNGS 

EVERY 4 

HOURS AS 

NEEDED FOR 

WHEEZING                                albuterol 

sulfate HFA 

90 

mcg/actuati

on aerosol 

inhaler 

INHALE 2 

PUFFS INTO 

THE LUNGS 

EVERY 4 

HOURS AS 

NEEDED FOR 

WHEEZING                 No                                      albuterol 

sulfate 

HFA 90 

mcg/actuat

ion 

aerosol 

inhaler 

INHALE 2 

PUFFS INTO 

THE LUNGS 

EVERY 4 

HOURS AS 

NEEDED FOR 

WHEEZING                                                    [facility]

 

                                                    amoxicillin 

500 

mg-potassiu

m 

clavulanate 

125 mg 

tablet TAKE 

1 TABLET BY 

MOUTH TWICE 

DAILY                                   amoxicillin 

500 

mg-potassiu

m 

clavulanate 

125 mg 

tablet TAKE 

1 TABLET BY 

MOUTH TWICE 

DAILY                   No                                      amoxicilli

n 500 

mg-potassi

um 

clavulanat

e 125 mg 

tablet 

TAKE 1 

TABLET BY 

MOUTH 

TWICE 

DAILY                                                       [facility]

 

                                                    amoxicillin 

875 

mg-potassiu

m 

clavulanate 

125 mg 

tablet TAKE 

1 TABLET BY 

MOUTH EVERY 

12 HOURS 

FOR 10 DAYS                             amoxicillin 

875 

mg-potassiu

m 

clavulanate 

125 mg 

tablet TAKE 

1 TABLET BY 

MOUTH EVERY 

12 HOURS 

FOR 10 DAYS                 No                                      amoxicilli

n 875 

mg-potassi

um 

clavulanat

e 125 mg 

tablet 

TAKE 1 

TABLET BY 

MOUTH 

EVERY 12 

HOURS FOR 

10 DAYS                                                     [facility]

 

                                                    azithromyci

n 250 mg 

tablet TAKE 

2 TABLETS 

BY MOUTH 

FOR 1 DAY 

THEN TAKE 1 

TABLET BY 

MOUTH DAILY 

FOR 4 DAYS 

THEREAFTER                              azithromyci

n 250 mg 

tablet TAKE 

2 TABLETS 

BY MOUTH 

FOR 1 DAY 

THEN TAKE 1 

TABLET BY 

MOUTH DAILY 

FOR 4 DAYS 

THEREAFTER                 No                                      azithromyc

in 250 mg 

tablet 

TAKE 2 

TABLETS BY 

MOUTH FOR 

1 DAY THEN 

TAKE 1 

TABLET BY 

MOUTH 

DAILY FOR 

4 DAYS 

THEREAFTER                                                  [facility]

 

                                                    benzonatate 

100 mg 

capsule 

TAKE 1 

CAPSULE BY 

MOUTH EVERY 

8 HOURS AS 

NEEDED FOR 

COUGH                                   benzonatate 

100 mg 

capsule 

TAKE 1 

CAPSULE BY 

MOUTH EVERY 

8 HOURS AS 

NEEDED FOR 

COUGH                   No                                      benzonatat

e 100 mg 

capsule 

TAKE 1 

CAPSULE BY 

MOUTH 

EVERY 8 

HOURS AS 

NEEDED FOR 

COUGH                                                       [facility]

 

                                                    BinaxNOW 

COVID-19 Ag 

Self Test 

kit TEST AS 

DIRECTED 

TODAY                                   [facility] 

COVID-19 Ag 

Self Test 

kit TEST AS 

DIRECTED 

TODAY                   No                                      [facility] 

COVID-19 

Ag Self 

Test kit 

TEST AS 

DIRECTED 

TODAY                                                       [facility]

 

                                                    cefdinir 

300 mg 

capsule 

TAKE 1 

CAPSULE BY 

MOUTH TWICE 

DAILY FOR 

10 DAYS                                 cefdinir 

300 mg 

capsule 

TAKE 1 

CAPSULE BY 

MOUTH TWICE 

DAILY FOR 

10 DAYS                 No                                      cefdinir 

300 mg 

capsule 

TAKE 1 

CAPSULE BY 

MOUTH 

TWICE 

DAILY FOR 

10 DAYS                                                     [facility]

 

                                                    ciprofloxac

in 250 mg 

tablet TAKE 

1 TABLET BY 

MOUTH EVERY 

12 HOURS 

FOR 7 DAYS                              ciprofloxac

in 250 mg 

tablet TAKE 

1 TABLET BY 

MOUTH EVERY 

12 HOURS 

FOR 7 DAYS                 No                                      ciprofloxa

parker 250 mg 

tablet 

TAKE 1 

TABLET BY 

MOUTH 

EVERY 12 

HOURS FOR 

7 DAYS                                                      [facility]

 

                                                    ciprofloxac

in 500 mg 

tablet TAKE 

1 TABLET BY 

MOUTH TWICE 

DAILY                                   ciprofloxac

in 500 mg 

tablet TAKE 

1 TABLET BY 

MOUTH TWICE 

DAILY                   No                                      ciprofloxa

parker 500 mg 

tablet 

TAKE 1 

TABLET BY 

MOUTH 

TWICE 

DAILY                                                       [facility]

 

                                                    diclofenac 

1 % topical 

gel                                     diclofenac 

1 % topical 

gel                     No                                      diclofenac 

1 % 

topical 

gel                                                         [facility]

 

                                                    diclofenac 

sodium 75 

mg 

tablet,kassy

yed release                             diclofenac 

sodium 75 

mg 

tablet,kassy

yed release                 No                                      diclofenac 

sodium 75 

mg 

tablet,del

ayed 

release                                                     [facility]

 

                                                    methylpredn

isolone 4 

mg tablets 

in a dose 

pack FOLLOW 

PACKAGE 

DIRECTIONS                              methylpredn

isolone 4 

mg tablets 

in a dose 

pack FOLLOW 

PACKAGE 

DIRECTIONS                 No                                      methylpred

nisolone 4 

mg tablets 

in a dose 

pack 

FOLLOW 

PACKAGE 

DIRECTIONS                                                  [facility]

 

                                                    ondansetron 

HCl 4 mg 

tablet                                  ondansetron 

HCl 4 mg 

tablet                  No                                      ondansetro

n HCl 4 mg 

tablet                                                      [facility]

 

                                                    phenazopyri

dine 200 mg 

tablet TAKE 

1 TABLET BY 

MOUTH EVERY 

8 HOURS AS 

NEEDED FOR 

URINARY 

PROBLEMS 

FOR 3 DAYS                              phenazopyri

dine 200 mg 

tablet TAKE 

1 TABLET BY 

MOUTH EVERY 

8 HOURS AS 

NEEDED FOR 

URINARY 

PROBLEMS 

FOR 3 DAYS                 No                                      phenazopyr

idine 200 

mg tablet 

TAKE 1 

TABLET BY 

MOUTH 

EVERY 8 

HOURS AS 

NEEDED FOR 

URINARY 

PROBLEMS 

FOR 3 DAYS                                                  [facility]

 

                                                    sulfamethox

azole 800 

mg-trimetho

prim 160 mg 

tablet TAKE 

1 TABLET BY 

MOUTH EVERY 

DAY BEFORE 

INTERCOURSE                             sulfamethox

azole 800 

mg-trimetho

prim 160 mg 

tablet TAKE 

1 TABLET BY 

MOUTH EVERY 

DAY BEFORE 

INTERCOURSE                 No                                      sulfametho

xazole 800 

mg-trimeth

oprim 160 

mg tablet 

TAKE 1 

TABLET BY 

MOUTH 

EVERY DAY 

BEFORE 

INTERCOURS

E                                                           [facility] 

Medical

 

                NexIUM  NexIUM                  No                      1{capsu

le}                       NexIUM                                 







Procedures





                          Procedure    Date / Time Performed Performing Clinicia

n Source

 

                          CT Abdomen Pelvis WO Con 2023 20:25:00          

    [facility]

 

                                                    XR Abdomen 2 View/1 View 

Cxr                 2023 17:06:00                     [facility]

 

                                                    Hysterectomy (Ovaries 

Remain)                                                     [facility]

 

                          Oral / Dental Surgery                           [facility]

 Medical







Plan of Care





                      Planned Activity Planned Date Details    Comments   Source

 

                      Instructions                                  [facility]

al







Encounters





                                                    Start 

Date/Time                               End 

Date/Time                               Encounter 

Type                                    Admission 

Type                                    Attending 

Clinicians                              Care 

Facility                                Care 

Department                              Encounter 

ID                                      Source

 

                                                    2024 

08:06:00                        Outpatient                      Debi Bergeron            [facility]              [id number]

[id number]                                   [facility]

 

                                                    2023 

10:44:00                        Inpatient       Urgent          Nicole Gomez                    [facility]                     Medical 

Service                                 [id number]

[id number]                                      [facility]

 

                                                    2023 

11:56:00                        Inpatient       Elective        Faraz Garcia)               [facility]                     Surgical 

Service                                 [id number]

[id number]                                      [facility]

 

                                                    2023 

10:19:00                        Outpatient                      Debi Bergeron            [facility]              [id number]

[id number]                                   [facility]

 

                                                    2022 

14:45:00                        Outpatient                      Debi Bergeron            [facility]              [id number]

[id number]                                   [facility]

 

                                                    2022 

09:04:28                        Outpatient                      Debi Bergeron            [facility]              [id number]

[id number]                                   [facility]

 

                                                    2022 

11:58:18                        Outpatient                      Debi Esteban               [facility]              [id number]

[id number]                                   [facility]

 

                                                    2024 

00:00:00                                2024 

00:00:00  (TEL)                         [facility]    [id number]   [facility] 

[facility]

 

                                                    2023 

16:00:00                                2023 

16:00:00            Outpatient                              DEVORA TIRADO           [facility]          [id number]       [facility]

 

                                                    2023 

00:00:00                                2023 

00:00:00            Outpatient                              CIARAN 

YVETTE          [facility]          [id number]       [facility]

 

                                                    2023-10-16 

08:00:00                                2023-10-16 

08:00:00            Outpatient                              KEVEN XIE         [facility]          [id number]       [facility]

 

                                                    2023-07-10 

00:00:00                                2023-07-10 

00:00:00            Outpatient                              GC_BCSS_How

[email or web address]            PRIV                PRIV                [id number]

[id number]                                 [facility]

 

                                                    2023-07-10 

00:00:00                                2023-07-10 

00:00:00            Outpatient                              GC_BCSS_How

[email or web address]            PRIV                PRIV                [id number]

[id number]                                 [facility]

 

                                                    2023 

15:13:00                                2023 

05:20:00            Emergency           ER                  ChristieSandie               [facility]              [id number]

[id number]                               [facility]

 

                                                    2023 

00:00:00                                2023 

00:00:00            Outpatient                              CANDIDA 

DEVORA HSU          [id number]       [facility]

 

                                                    2023 

00:00:00                                2023 

00:00:00            Outpatient                              YVETTE WAGONER          [id number]       [facility]

 

                                                    2023 

00:00:00                                2023 

00:00:00            Outpatient                              GC_BCSS_How

[email or web address]            PRIV                PRIV                [id number]

[id number]                                 [facility] 

Medical

 

                                                    2023 

00:00:00                                2023 

00:00:00            Outpatient                              GC_BCSS_How

[email or web address]            PRIV                PRIV                [id number]

[id number]                                 [facility] 

Medical

 

                                                    2023 

00:00:00                                2023 

00:00:00            Outpatient                              GC_BCSS_How

[email or web address]            PRIV                PRIV                [id number]

[id number]                                 [facility]

 

                                                    2023 

00:00:00                                2023 

00:00:00            Outpatient                              GC_BCSS_How

[email or web address]            PRIV                PRIV                [id number]

[id number]                                 [facility]

 

                                                    2023-05-15 

00:00:00                                2023-05-15 

00:00:00            Outpatient                              GC_BCSS_How

[email or web address]            PRIV                PRIV                [id number]

[id number]                                 [facility]

 

                                                    2023 

00:00:00                                2023 

00:00:00            Outpatient                              GC_BCSS_How

[email or web address]            PRIV                PRIV                [id number]

[id number]                                 [facility]

 

                                                    2023 

00:00:00                                2023 

00:00:00            Outpatient                              GC_BCSS_How

[email or web address]            PRIV                PRIV                [id number]

[id number]                                 [facility]

 

                                                    2023 

00:00:00                                2023 

00:00:00                                Faraz Garcia MD: [address] [place], [place] [id number]

, Ph. 

[phone]                                        [facility] 

GC_BCSS_11s

t Office                  [id number]                  [facility]

 

                                                    2023 

00:00:00                                2023 

00:00:00            Outpatient                              GC_BCSS_How

[email or web address]            PRIV                PRIV                [id number]

[id number]                                 [facility]

 

                                                    2023 

00:00:00                                2023 

00:00:00            Outpatient                              GC_BCSS_How

[email or web address]_Dan1            PRIV                PRIV                [id number]

[id number]                                 [facility]

 

                                                    2023 

00:00:00                                2023 

00:00:00            Outpatient                              GC_BCSS_How

[email or web address]            PRIV                PRIV                [id number]

[id number]                                 [facility]

 

                                                    2023 

00:00:00                                2023 

00:00:00            Outpatient                              GC_BCSS_How

[email or web address]            PRIV                PRIV                [id number]

[id number]                                 [facility]

 

                                                    2023 

00:00:00                                2023 

00:00:00            Outpatient                              DEVORA TIRADO          [id number]       [facility]

 

                                                    2022 

00:00:00                                2022 

00:00:00            Outpatient                              GC_BVWC_Sou

th_J                PRIV                [facility]                [id number]

[id number]                                 [facility]

 

                                                    2022 

00:00:00                                2022 

00:00:00            Outpatient                              DEVORA TIRADO          [id number]       [facility]

 

                                                    2022-11-10 

00:00:00                                2022-11-10 

00:00:00            Outpatient                              PREZAS 

YVETTE HSU          [id number]       [facility]

 

                                                    2022-10-10 

12:20:00                                2022-10-10 

12:20:00  Outpatient                     [facility]    [id number] [facility] 

[facility]

 

                                                    2022-10-10 

00:00:00                                2022-10-10 

00:00:00            Outpatient                              DEVORA TIRADO          [id number]       [facility]

 

                                                    2022 

00:00:00                                2022 

00:00:00            Outpatient                              DEVORA TIRADO          [id number]       [facility]

 

                                                    2022-09-15 

13:00:00                                2022-09-15 

13:00:00  Outpatient           ANDREEA DIANE HSU    [id number] [facility]

 

                                                    2022-09-15 

11:25:00                                2022-09-15 

11:25:00  Outpatient           MIKA HSU    [id number] [place] 

[facility]

 

                                                    2022-09-15 

00:00:00                                2022-09-15 

00:00:00            Outpatient                              PREZAS, 

YVETTE HSU          [id number]       [facility]

 

                                                    2022-09-15 

00:00:00                                2022-09-15 

00:00:00            Outpatient                              PREZAS, 

YVETTE HSU          [id number]       [facility]

 

                                                    2022 

00:00:00                                2022 

00:00:00            Outpatient                              PREZAS, 

YVETTE HSU          [id number]       [facility]

 

                                                    2022 

00:00:00                                2022 

00:00:00            Outpatient                              DEVORA TIRADO          [id number]       [facility]

 

                                                    2022 

10:05:00                                2022 

10:05:00  Outpatient           LAB90     SIENNA JURADOSEY    [id number] [facility]

 

                                                    2022 

09:30:00                                2022 

09:45:00                                Office 

Visit                                               Devora Tirado                                 [place]                                 [id number]

[id number]

[id number]

[id number]

0                         [id number]                 [facility]

 

                                                    2022 

00:00:00                                2022 

00:00:00            Outpatient                              DEVORA TIRADO          SIENNA          [id number]       [facility]

 

                                                    2022 

10:45:00                                2022 

11:00:00                                Office 

Visit                                               Devora Tirado                                 [place]                                 [id number]

[id number]

1.2.7.2.686

[id number]

0                         [id number]                 [facility]

 

                                                    2022-08-15 

16:15:00                                2022-08-15 

16:15:00            Outpatient                              DEVORA TIRADO          SIENNA          [id number]       [facility]

 

                                                    2022-08-15 

00:00:00                                2022-08-15 

00:00:00            Outpatient                              DEVORA TIRADO           [facility]          [id number]       [facility]

 

                                                    2022 

00:00:00                                2022 

00:00:00            Outpatient                              DEVORA TIRADOAREN HSU          [id number]       [facility]

 

                                                    2022 

00:00:00                                2022 

00:00:00            Outpatient                              DEVORA TIRADOAREN HSU          [id number]       [facility]

 

                                                    2022 

14:50:00                                2022 

14:50:00  Outpatient           LABAnt JURADOAREN HSU    [id number] [facility]

 

                                                    2022 

14:00:00                                2022 

14:15:00                                Office 

Visit                                               Devora Tirado                                 [place]                                 [id number]

[id number]

1.2.7.2.686

[id number]

0                         [id number]                 [facility]

 

                                                    2021-10-18 

01:29:00                                2021-10-18 

01:29:00            Emergency           ER                  Rayshawn Lira               [facility]              [facility]              [id number]

[id number]                               [facility]

 

                                                    2020 

11:20:00                                2020 

11:20:00        Outpatient                                      [facility]

[facility] 

Family 

Medicine                                [facility]                  [id number]                   [facility]

 

                                                    2020 

08:20:00                                2020 

08:20:00        Outpatient                                      [facility]

t [facility] 

Family 

Medicine                                [facility] 

Medicine                  [id number]                   [facility]

 

                                                    2020 

14:39:00                                2020 

14:39:00        Outpatient                                      [facility]

t [facility] 

Family 

Medicine                                [facility] 

Medicine                  [id number]                   [facility]

 

                                                    2020 

15:45:00                                2020 

15:45:00        Outpatient                                      [facility]

t [facility] 

Family 

Medicine                                [facility] 

Medicine                  [id number]                   [facility]

 

                                                    2020 

14:00:00                                2020 

14:00:00        Outpatient                                      [facility]

t [facility] 

Family 

Medicine                                [facility] 

Medicine                  [id number]                   [facility] 

[facility] 

[facility]

 

                                                    2020 

11:16:00                                2020 

11:16:00        Outpatient                                      [facility]

t [facility] 

Family 

Medicine                                [facility] 

Medicine                  [id number]                   [facility]

 

                                                    2020 

15:52:00                                2020 

15:52:00            Outpatient                              Anne Marie Boone             CHMELISSA             [id number]          [facility]

 

                                                    2020 

15:00:00                                2020 

15:00:00            Outpatient                              Anne Marie Boone             [facility]             [id number]          [facility]

 

                                                    2019 

08:40:00                                2019 

08:40:00        Outpatient                                      [facility]

t [facility] 

Family 

Medicine                                [facility] 

Medicine                  [id number]                   [facility]







Results





                    Test Description Test Time Test Comments Results   Result Co

mments Source







                                        

 

                                        

 

                                        

 

                                        

 

                                        

 

                                        

 

                                        

 

                                        

 

                                        

 

                                        

 

                                        

 

                                        

 

                                        

 

                                        

 

                                        

 

                                        

 

                                        

 

                                        

 

                                        

 

                                        





HIndications to order a Urinalysis: [facility]teredMental st.lethargyUrine Source: Urine
[place] [id number] 18:54:00* 



                      Test Item  Value      Reference Range Interpretation Comme

nts

 

                      Urinalysis (test code = UACRFLXNO) No                     

          





HIndications to order a Urinalysis: [facility] Source: Urine
[place] CatheterUrine [id number] 18:29:00* 



                      Test Item  Value      Reference Range Interpretation Comme

[facility]

 

                      Urine Color (test code = 5778-6) Yellow     Yellow        

        





[facility] nfncqys7926-34-66 18:29:00* 



                      Test Item  Value      Reference Range Interpretation Comme

[facility]

 

                      Urine Clarity (test code = 31327-7) Hazy       Clear      

           





[facility] HospitalSpecific gravity of Urine by Test strip
2023 18:29:00* 



                      Test Item  Value      Reference Range Interpretation Comme

[facility]

 

                                                    Urine Specific Gravity (test

 code = 

5811-5)         1.015           1.005-1.030                     





[facility] pH measurement by automated test strip
2023 18:29:00* 



                      Test Item  Value      Reference Range Interpretation Comme

[facility]

 

                      Urine pH (test code = 74607-2) 7.0        5.0-9.0         

      





[facility] leukocyte esterase detection by automated 
test kmgma4739-17-19 18:29:00* 



                      Test Item  Value      Reference Range Interpretation Comme

[facility]

 

                                                    Urine Leukocyte Esterase (te

st code = 

46211-0)        Trace           Negative                        





[facility] HospitalNitrite [Presence] in Urine by Test strip
2023 18:29:00* 



                      Test Item  Value      Reference Range Interpretation Comme

[facility]

 

                      Urine Nitrite (test code = 5802-4) Negative   Negative    

          





[facility] protein measurement by automated test 
strip (mass/volume)2023 18:29:00* 



                      Test Item  Value      Reference Range Interpretation Comme

[facility]

 

                                                    Urine Protein (test code = 

00037-3)        Negative mg/dL  Neg-Trace                       





[facility] glucose measurement by test strip 
(mass/volume)2023 18:29:00* 



                      Test Item  Value      Reference Range Interpretation Comme

[facility]

 

                                                    Urine Glucose (UA) (test cod

e 

= 5792-7)       Negative mg/dL  Negative                        





[facility] ketones measurement by automated test 
strip (mass/volume)2023 18:29:00* 



                      Test Item  Value      Reference Range Interpretation Comme

nts

 

                      Urine Ketones (test code = 37112-5) 80 mg/dL   Negative   

           





[facility] urobilinogen measurement (units/volume) by
 test ikucd8080-50-29 18:29:00* 



                      Test Item  Value      Reference Range Interpretation Comme

nts

 

                                                    Urine Urobilinogen (test cod

e = 

88314-7)        0.2 mg/dL       Less than 2                     





[facility] total bilirubin detection by automated 
test zqepx2597-03-88 18:29:00* 



                      Test Item  Value      Reference Range Interpretation Comme

nts

 

                                                    Urine Bilirubin (test code =

 

71863-7)        Negative        Negative                        





[facility] hemoglobin detection by automated test 
qpvpp4729-53-86 18:29:00* 



                      Test Item  Value      Reference Range Interpretation Comme

nts

 

                      Urine Blood (test code = 68998-5) Large      Negative     

         





[facility] sediment erythrocyte count by microscopy 
(number/high power field)2023 18:29:00* 



                      Test Item  Value      Reference Range Interpretation Comme

nts

 

                      Urine RBC (test code = 78789-7) 21-50 HPF  0-3            

       





[facility] HospitalLeukocytes detection in urine sediment by light 
xmkjgyobav0544-12-33 18:29:00* 



                      Test Item  Value      Reference Range Interpretation Comme

nts

 

                      Urine WBC (test code = 72763-6) 0-3 HPF    0-3            

       





[facility] HospitalSquamous epithelial cells detection in urine 
sediment by light wansoknfhx4888-77-26 18:29:00* 



                      Test Item  Value      Reference Range Interpretation Comme

nts

 

                                                    Urine Squamous Epithelial Ce

lls (test 

code = 85031-4) 0-3 HPF         0-3                             





[facility] HospitalBacteria detection in urine sediment by light 
stgkfrphtg1008-20-64 18:29:00* 



                      Test Item  Value      Reference Range Interpretation Comme

nts

 

                                                    Urine Bacteria (test code = 

96977-5)        Rare-Few HPF    None Seen                       





[facility] HospitalMucus detection in urine sediment by light 
rianvyquwi1331-91-90 18:29:00* 



                      Test Item  Value      Reference Range Interpretation Comme

nts

 

                                                    Urine Mucus (test 

code = 8247-9)  Few LPF         See_Comment                     [Automated messa

ge] The 

system which generated 

this result transmitted 

reference range: <2+. The 

reference range was not 

used to interpret this 

result as normal/abnormal.





[facility] HospitalDo Not Yvy3105-12-44 18:29:00* 



                      Test Item  Value      Reference Range Interpretation Comme

[facility]

 

                                                    Urine Culture Reflexed (test

 code = 

LOINC)          No                                              





[facility] HospitalChemistry2023-07-[facility] 18:13:00* 



                      Test Item  Value      Reference Range Interpretation Comme

nts

 

                                                    Chemistry (test code = 

NA-T)           140 mmol/L      136-145         N               

 

                                                    Chemistry (test code = 

K-T)            4.0 mmol/L      3.5-5.1         N               

 

                                                    Chemistry (test code = 

CL-T)           102 mmol/L                N               

 

                                                    Chemistry (test code = 

CO2-T)          28 mmol/L       22-29           N               

 

                                                    Chemistry (test code = 

ANGP)           14 mmol/L       10-20           N               

 

                                                    Chemistry (test code = 

BUN)            6 mg/dL         9.8-20.1        L               

 

                                                    Chemistry (test code = 

CREATT)         0.68 mg/dL      0.6-1.1         N               

 

                                                    Chemistry (test code = 

EGFRCR)         102                                             Reference Range 

for 

Estimated GFR: 

Greater than 90 

mL/min/1.73 

j4Okxtlzlw eGFR is 

based on the CKD-EPI 

 equation 

thatdoes not use a 

race coefficient.

 

                                                    Chemistry (test code = 

GLU-T)          96 mg/dL                  N               

 

                                                    Chemistry (test code = 

CA-T)           9.3 mg/dL       7.8-10.44       N               

 

                                                    Chemistry (test code = 

TBILI-T)        0.3 mg/dL       0.2-1.2         N               

 

                                                    Chemistry (test code = 

TP)             7.0 g/dL        6.0-8.3         N               

 

                                                    Chemistry (test code = 

ALB)            3.8 g/dL        3.5-5.0         N               

 

                                                    Chemistry (test code = 

GLOB)           3.2 g/dL        2.4-3.5         N               

 

                                                    Chemistry (test code = 

AG)             1.2 g/dL        1.2-2.2         N               

 

                                                    Chemistry (test code = 

ALP)            76 U/L                    N               

 

                                                    Chemistry (test code = 

AST)            67 U/L          5-34            H               

 

                                                    Chemistry (test code = 

ALT)            110 U/L         8-55            H               





S KTebvceunj0312-34-40 18:13:00* 



                      Test Item  Value      Reference Range Interpretation Comme

nts

 

                      Chemistry (test code = LIP) 7 U/L      8-78       L       

   





S HHsjsusuugt3546-26-92 17:53:00* 



                      Test Item  Value      Reference Range Interpretation Comme

nts

 

                      Hematology (test code = WBCT) 9.9 10x3/uL 4.8-10.8   N    

      

 

                      Hematology (test code = RBCT) 3.82 mill/uL 4.20-5.40  L   

       

 

                      Hematology (test code = HGBT) 11.3 g/dL  12.0-16.0  L     

     

 

                      Hematology (test code = HCTT) 35.2 %     36.0-47.0  L     

     

 

                      Hematology (test code = MCV) 92.2 fl    78.0-98.0  N      

    

 

                      Hematology (test code = MCH) 29.7 pg    27.0-31.0  N      

    

 

                      Hematology (test code = MCHC) 32.2 g/dL  32.0-36.0  N     

     

 

                      Hematology (test code = RDW) 12.5 %     11.5-14.5  N      

    

 

                      Hematology (test code = PLTT) 332 10x3/uL 130-400    N    

      

 

                      Hematology (test code = MPV) 9.5 fL     7.4-10.4   N      

    

 

                      Hematology (test code = %NEUT) 69.8 %     42.0-75.0  N    

      

 

                      Hematology (test code = %LYMPH) 16.4 %     21.0-51.0  L   

       

 

                      Hematology (test code = %MONO) 11.0 %     0.0-10.0   H    

      

 

                      Hematology (test code = %EOS) 1.5 %      0.0-10.0   N     

     

 

                      Hematology (test code = %BASO) 1.4 %      0.0-1.0    H    

      

 

                      Hematology (test code = NEUT#) 6.9 thou/uL 1.40-6.50  H   

       

 

                      Hematology (test code = LYMPH#) 1.6 thou/uL 1.20-3.40  N  

        

 

                      Hematology (test code = MONO#) 1.1 thou/uL 0.11-0.59  H   

       

 

                      Hematology (test code = EOS#) 0.1 thou/uL 0.0-0.7    N    

      

 

                      Hematology (test code = BASO#) 0.1 thou/uL 0.0-0.2    N   

       





SSerum or plasma sodium measurement (moles/volume)2023 17:40:00* 



                      Test Item  Value      Reference Range Interpretation Comme

nts

 

                      Sodium Level (test code = 2951-2) 140 mmol/L 136-145      

         





[facility] or plasma potassium measurement 
(moles/volume)2023 17:40:00* 



                      Test Item  Value      Reference Range Interpretation Comme

[facility]

 

                                                    Potassium Level (test code =

 

2823-3)         4.0 mmol/L      3.5-5.1                         





[facility] or plasma chloride measurement 
(moles/volume)2023 17:40:00* 



                      Test Item  Value      Reference Range Interpretation Comme

nts

 

                                                    Chloride Level (test code = 

2075-0)         102 mmol/L                                





[facility] or plasma carbon dioxide, total 
measurement (moles/volume)2023 17:40:00* 



                      Test Item  Value      Reference Range Interpretation Comme

[facility]

 

                                                    Carbon Dioxide Level (test c

ode = 

-9)         28 mmol/L       22-29                           





[facility] or plasma anion rxk1987-03-03 17:40:00* 



                      Test Item  Value      Reference Range Interpretation Comme

[facility]

 

                      Anion Gap (test code = 33037-3) 14 mmol/L  10-20          

       





[facility] nitrogen [facility] 
(mass/volume)2023 17:40:00* 



                      Test Item  Value      Reference Range Interpretation Comme

[facility]

 

                                                    Blood Urea Nitrogen (test co

de = 

3094-0)         6 mg/dL         9.8-20.1                        





[facility] or plasma creatinine measurement 
(mass/volume)2023 17:40:00* 



                      Test Item  Value      Reference Range Interpretation Comme

[facility]

 

                      Creatinine (test code = 2160-0) 0.68 mg/dL 0.6-1.1        

       





[facility] CareGlomerular filtration rate/1.73 sq M.predicted 
[Volume Rate/Area] in Serum, Plasma [id number] 17:40:00* 



                      Test Item  Value      Reference Range Interpretation Comme

nts

 

                                                    Estimated GFR (CKD-EPI )

 (test code 

= 80008-9)      102                                             





[facility] CareGlucose [Mass/volume] in Serum or Plasma
2023 17:40:00* 



                      Test Item  Value      Reference Range Interpretation Comme

nts

 

                      Glucose Level (test code = 2345-7) 96 mg/dL         

          





[facility] calcium measurement 
(mass/volume)2023 17:40:00* 



                      Test Item  Value      Reference Range Interpretation Comme

[facility]

 

                      Calcium Level (test code = 33061-7) 9.3 mg/dL  7.8-10.44  

           





[facility] Hospital[facility] or plasma total bilirubin measurement 
(mass/volume)2023 17:40:00* 



                      Test Item  Value      Reference Range Interpretation Comme

[facility]

 

                                                    Total Bilirubin (test code =

 

1975-2)         0.3 mg/dL       0.2-1.2                         





[facility] or plasma protein measurement 
(mass/volume)2023 17:40:00* 



                      Test Item  Value      Reference Range Interpretation Comme

[facility]

 

                                                    Serum Total Protein (test co

de = 

2885-2)         7.0 g/dL        6.0-8.3                         





[facility] or plasma albumin measurement by 
bromocresol green (BCG) dye binding method ([id number] 17:40:00* 



                      Test Item  Value      Reference Range Interpretation Comme

[facility]

 

                      Albumin (test code = 37744-4) 3.8 g/dL   3.5-5.0          

     





[facility] CareGlobulin [Mass/volume] in Serum by calculation
2023 17:40:00* 



                      Test Item  Value      Reference Range Interpretation Comme

[facility]

 

                      Globulin (test code = 33446-4) 3.2 g/dL   2.4-3.5         

      





[facility] HospitalAlbumin/Globulin [Mass Ratio] in Serum or Plasma
2023 17:40:00* 



                      Test Item  Value      Reference Range Interpretation Comme

[facility]

 

                                                    Albumin/Globulin Ratio (test

 code = 

1759-0)         1.2 g/dL        1.2-2.2                         





[facility] HospitalAlkaline phosphatase [Enzymatic activity/volume]
in Serum or Tgyvrk4216-14-66 17:40:00* 



                      Test Item  Value      Reference Range Interpretation Comme

[facility]

 

                                                    Alkaline Phosphatase (test c

ode = 

6768-6)         76 U/L                                    





[facility] or plasma aspartate aminotransferase 
measurement (enzymatic activity/volume)2023 17:40:00* 



                      Test Item  Value      Reference Range Interpretation Comme

nts

 

                                                    Aspartate Amino Transf (AST/

SGOT) 

(test code = 1920-8) 67 U/L          5-34                            





[facility] or plasma alanine aminotransferase 
measurement without P-5'-P (enzymatic tolmjt1540-50-59 17:40:00* 



                      Test Item  Value      Reference Range Interpretation Comme

nts

 

                                                    Alanine Aminotransferase (AL

T/SGPT) 

(test code = 1744-2) 110 U/L         8-55                            





[facility] or plasma lipase measurement (enzymatic 
activity/volume)2023 17:40:00* 



                      Test Item  Value      Reference Range Interpretation Comme

[facility]

 

                      Lipase (test code = 3040-3) 7 U/L      8-78               

   





[facility] HospitalLeukocytes [#/volume] in Blood by Automated 
upwpp5315-37-87 17:40:00* 



                      Test Item  Value      Reference Range Interpretation Comme

[facility]

 

                                                    White Blood Count (test code

 = 

6690-2)         9.9 10x3/uL     4.8-10.8                        





[facility] automated count 
(number/volume)2023 17:40:00* 



                      Test Item  Value      Reference Range Interpretation Comme

[facility]

 

                                                    Red Blood Count (test code =

 

789-8)          3.82 mill/uL    4.20-5.40                       





[facility] hemoglobin measurement (mass/volume)
2023 17:40:00* 



                      Test Item  Value      Reference Range Interpretation Comme

[facility]

 

                      Hemoglobin (test code = 718-7) 11.3 g/dL  12.0-16.0       

      





[facility] HospitalAutomated erythrocyte mean corpuscular volume
2023 17:40:00* 



                      Test Item  Value      Reference Range Interpretation Comme

[facility]

 

                                                    Mean Corpuscular Volume (arjun

t code = 

787-2)          92.2 fl         78.0-98.0                       





[facility] HospitalAutomated erythrocyte mean corpuscular 
hemoglobin (mass per erythrocyte)2023 17:40:00* 



                      Test Item  Value      Reference Range Interpretation Comme

[facility]

 

                                                    Mean Corpuscular Hemoglobin 

(test 

code = 785-6)   29.7 pg         27.0-31.0                       





[facility] CareAutomated erythrocyte mean corpuscular 
hemoglobin concentration measurement (mass/[id number] 17:40:00* 



                      Test Item  Value      Reference Range Interpretation Comme

nts

 

                                                    Mean Corpuscular Hemoglobin 

Concent 

(test code = 786-4) 32.2 g/dL       32.0-36.0                       





[facility] HospitalAutFormerly [facility] Careed erythrocyte distribution width ratio
2023 17:40:00* 



                      Test Item  Value      Reference Range Interpretation Comme

[facility]

 

                                                    Red Cell Distribution Width 

(test code 

= 788-0)        12.5 %          11.5-14.5                       





[facility] HospitalAutomated blood platelet count (count/volume)
2023 17:40:00* 



                      Test Item  Value      Reference Range Interpretation Comme

[facility]

 

                                                    Platelet Count (test code = 

777-3)          332 10x3/uL     130-400                         





[facility] HospitalAutomated blood platelet mean qsewcm7281-25-94 
17:40:00* 



                      Test Item  Value      Reference Range Interpretation Comme

[facility]

 

                                                    Mean Platelet Volume (test c

ode = 

21751-7)        9.5 fL          7.4-10.4                        





[facility] HospitalAutomated blood neutrophils/100 leukocytes
2023 17:40:00* 



                      Test Item  Value      Reference Range Interpretation Comme

[facility]

 

                      Neutrophils % (test code = 770-8) 69.8 %     42.0-75.0    

         





[facility] HospitalLymphocytes/100 leukocytes in Blood by Automated
 [id number] 17:40:00* 



                      Test Item  Value      Reference Range Interpretation Comme

[facility]

 

                      Lymphocytes % (test code = 736-9) 16.4 %     21.0-51.0    

         





[facility] HospitalAutomated blood monocytes/100 leukocytes
2023 17:40:00* 



                      Test Item  Value      Reference Range Interpretation Comme

[facility]

 

                      Monocytes % (test code = 5905-5) 11.0 %     0.0-10.0      

        





[facility] HospitalAutomated blood eosinophils/100 leukocytes
2023 17:40:00* 



                      Test Item  Value      Reference Range Interpretation Comme

[facility]

 

                      Eosinophils % (test code = 713-8) 1.5 %      0.0-10.0     

         





[facility] HospitalAutomated blood basophils/100 leukocytes
2023 17:40:00* 



                      Test Item  Value      Reference Range Interpretation Comme

[facility]

 

                      Basophils % (test code = 706-2) 1.4 %      0.0-1.0        

       





[facility] neutrophils automated count 
(number/volume)2023 17:40:00* 



                      Test Item  Value      Reference Range Interpretation Comme

[facility]

 

                      Neutrophils # (test code = 751-8) 6.9 thou/uL 1.40-6.50   

          





[facility] HospitalLymphocytes [#/volume] in Blood by Automated 
podux2884-41-64 17:40:00* 



                      Test Item  Value      Reference Range Interpretation Comme

[facility]

 

                      Lymphocytes # (test code = 731-0) 1.6 thou/uL 1.20-3.40   

          





[facility] monocytes automated count (number/volume)
2023 17:40:00* 



                      Test Item  Value      Reference Range Interpretation Comme

[facility]

 

                      Monocytes # (test code = 742-7) 1.1 thou/uL 0.11-0.59     

        





[facility] eosinophils automated count (count/volume)
2023 17:40:00* 



                      Test Item  Value      Reference Range Interpretation Comme

[facility]

 

                      Eosinophils # (test code = 711-2) 0.1 thou/uL 0.0-0.7     

          





[facility] HospitalAutomated blood basophil count (count/volume)
2023 17:40:00* 



                      Test Item  Value      Reference Range Interpretation Comme

[facility]

 

                      Basophils # (test code = 704-7) 0.1 thou/uL 0.0-0.2       

        





[facility] CareGlucose Abnwpekoofg7763-98-37 20:10:00* 



                      Test Item  Value      Reference Range Interpretation Comme

[facility]

 

                                                    Glucose Fingerstick 

(test code = WGLUC) 94 mg/dL                                   Skyler Bojorquez RN or MD





Hemoglobin and Ampydxaqri6712-86-32 07:45:00* 



                      Test Item  Value      Reference Range Interpretation Comme

[facility]

 

                      Hemoglobin (test code = HGBT) 10.5 g/dL  12.2-14.8  L     

     

 

                      Hematocrit (test code = HCTT) 33.5 %     36.5-44.4  L     

     





Basic Metabolic Gacxd2232-89-25 07:45:00* 



                      Test Item  Value      Reference Range Interpretation Comme

nts

 

                                                    SODIUM (test code = 

NA)             140.0 mmol/L    136.0-145.0     N               

 

                                                    Potassium,K (test 

code = K)       4.3 mmol/L      3.0-5.1         N               

 

                                                    Chloride (test code 

= CL)           108 mmol/L                H               

 

                                                    Carbon Dioxide (test 

code = CO2)     26 mmol/L       20-31           N               

 

                                                    Anion Gap (test code 

= GAP)          6 mmol/L        5-15            N               

 

                                                    Blood Urea Nitrogen 

(test code = BUN) 10 mg/dL        9-23            N               

 

                                                    Creatinine (test 

code = CREATT)  0.72 mg/dL      0.55-1.02       N               

 

                                                    Creatinine Clr Calc 

Pharmacy (test code 

= CRCLPHA)      80.70 mL/min                                    

 

                                                    Estimated Glomerular 

Filt Rate (test code 

= EGFR.XX)      97              See_Comment                     Reported eGFR is

 

based on the CKD-EPI 

 equation 

thatdoes not use a 

race coefficient. 

Additional 

information canbe 

found 

at:02-10-8361_icb_egf

r_summary_flyer5.pdf 

([email or web address][facility]org) 

[Automated message] 

The system which 

generated this result 

transmitted reference 

range: >=90 

ml/min/1.73m2. The 

reference range was 

not used to interpret 

this result as 

normal/abnormal.

 

                                                    BUN/Creatinine Ratio 

(test code = 

BCRATIO)        14 ratio        10-20           N               

 

                                                    Glucose (test code = 

GLU)            87 mg/dL                  N               

 

                                                    Osmolality,Calculate

d (test code = 

OSMOC)          287.5                                           

 

                                                    Calcium (test code = 

CA)             8.6 mg/dL       8.3-10.6        N               





Glucose Axsgdwgaplc3129-22-09 21:47:00* 



                      Test Item  Value      Reference Range Interpretation Comme

nts

 

                                                    Glucose Fingerstick 

(test code = WGLUC) 182 mg/dL                                  Skyler Bojorquez RN or MD





XR voiding cystourethrogrm (p)[facility] [address]
[place] [address] [phone] Patient Name: 
Lilliana Saunders  Medical Record#: [id number] Address: [address] [place] [address] 
Account#: [id number] City/State/Zip: [place] [address]  Attending Dr: Nicole Gomez MD Phone: [phone] Insurance: [employer] BLUE ADVANTAGE EXC HANGE 
/Age/Sex: 58/F  Self Pay Admit/Reg Date: 23 Ordering Dr: 
Faraz Garcia MD Location: SJM5S/-B PCP: Segun Crawford NP Date of 
Service: 23 Order (s): XR voiding cystourethrogrm (p) CPT Code: 03027  
Report Number: [id number] Reason for Exam: h/o colovessicle fistula Clinical 
history: Colovesical fistula. Location: R 16.FINDINGS: Following administration 
of the risks, benefits, and alternatives to the procedure, the patient gave oral
and written consent. Then, using sterile technique, a total of 150 mL contrast 
was instilled into the bladder under fluoroscopy. The bladder demonstrates a 
normal configuration. No vesicoureteral reflux is noted during the course of 
this examination. There is no evidence of fistula.There is complete emptying of 
the bladder on the postvoid image. A total of 1.13 minutes fluoroscopy time is 
utilized. Reference air kerma is 39.1 mGy. IMPRESSION: 1.  No abnormalities are 
identified. Electronically signed by: Yvette Alonzo MD 2023 3:12 PM CDT 
Workstation: [id number] Dictated By: Yvette Alonzo MD 23 142  Signed 
By: Yvette Alonzo MD 23 1514 TD/TT: 23 142 Tech: [id number]  cc: 
[id number]; [id number]* Faraz Garcia MD; Segun Crawford. NlXR small bowel 
follow thru (SBSt. [facility] [address] [place] [address] [phone] Patient Name: Lilliana Saunders Medical 
Record#: [id number] Address: [address] [place] [address] Account#: [id number] 
City/State/Zip: [place] [address] Attending Dr: Nicole Gomez MD Phone: [phone]
[phone] Insurance: [employer] BLUE ADVANTAGE EXC HANGE /Age/Sex: 1965/58/F Self 
Pay Admit/Reg Date: 23 Ordering Dr: Faraz Garcia MD [facility] PCP: Segun Crawford NP  Date of Service: 23 Order (s): 
XR small bowel follow thru (SB  CPT Code: 63413 Report Number: [id number]
Reason for Exam: abdominal distention  EXAMINATION: XR small bowel follow thru 
(SB CLINICAL INDICATION: Female, 58 years old with abdominal distention 
TECHNIQUE:  KUB is performed. Patient was subsequently administered oral 
contrast, serial KUB examinations are performed until contrast reachesthe colon.
Spot images are obtained as needed. COMPARISON: None TOTAL FLUOROSCOPY TIME: 0 
seconds TOTAL NUMBER OF IMAGES: 7 FINDINGS:  KUB demonstrates mild gaseous 
distention of the stomach andsmall bowel. Oral contrast was administered, 
opacifying the stomach. The duodenal sweep is unremarkable. There is prominence 
of thejejunum with mild fold thickening. Distal small bowel is visualized and 
appears to be of normal caliber, slightly featureless. The colon is visualized 
at 5 hours, slightly delayed bowel transit. IMPRESSION: Colon is visualized at 5
hours suggesting that there is slight delay in transit time. Proximal jejunum is
distended, mild wall thickening. Electronically signed by: Vinh Nair MD 
2023 6:10 PM CDT Workstation: [id number] Dictated By: Vinh Nair MD 0
23 123  Signed By: Vinh Nair MD 23 TD/TT: 23 123 
Tech: [id number]  cc: [id number]; [id number]* Faraz Garcia MD; Segun Crawford. Nl
CT Abdomen Pelvis WO Con
************************************************************[facility]SPC[facility] : 1965 Sex: 
F************************************************************[facility] Pt Name: LILLIANA SAUNDERS Cross Phys: [place] [address] : 1965 Age: 58 SEX:F [phone] Exam Date: 
23 Status: REG ER  Acct: [id number] Loc: [facility] Pt Unit #: [id number] 
Report #: [id number] CC: Sandie Lucio MD PULSECHECKSJX:[id number] CAT SCAN 
REPORT Report Status: Signed Order # Category/Exam [id number] CT/CT Abdomen 
Pelvis WO Con ([id number]): . Results CT Abdomen Pelvis WO Con: 7/3/2023 8:35 PM
HISTORY: Small bowel obstruction.  COMPARISON: None. TECHNIQUE: Multiple 
contiguous axial images were obtained and a CT of the abdomen and pelvis without
IV contrast. Coronal and sagittal reformats were performed. FINDINGS: This 
examination is limited for the evaluation of solid organs and vascular structur
es due to the lack of intravenous contrast. Lower Chest: Bibasilar subsegmental 
atelectasis and small bilateral effusions. Abdomen: Liver: within normal limits.
Gallbladder: Surgically absent Bile Ducts: Normal caliber. Pancreas: within 
normal limits. Spleen: within normal limits. Adrenals: within normal limits. 
Kidneys: within normal limits. Pelvis: Reproductive Organs: No pelvic masses. 
Ureters: within normal limits. Bladder: Hutchinson catheter within the urinary 
bladder. Bowel: Dilated small bowel loops with air-fluid levels. Transition 
point not clearly delineated. Anastomotic suture lines are seen in the sigmoid. 
Mesenteric Lymph Nodes: No enlarged mesenteric lymph nodes. Peritoneum: Mild p
elvic free fluid and generalized mesenteric stranding. Vessels: Atherosclerotic 
calcifications in the aorta Retroperitoneum: within normal limits. Abdominal 
Wall: within normal limits. Bones: Unremarkable. IMPRESSION: 1. Dilated small 
bowel loops with air-fluid levels representing ileus versus obstruction.  2. 
Transition point not clearly delineated. Reported By: Edd Ritchie 
Electronically Signed: 7/3/2023 9:01 PM Reported By: Edd Ritchie DO 
Electronically Signed Date/Time: 23 Technologist: ABDIAZIZ Dictated 
Date/Time: 23 Transcribed Date/Time:XR Abdomen 2 View/1 View Cxr
************************************************************[facility] : 1965 Sex: 
F************************************************************[facility] Pt Name: LILLIANA SAUNDERS [address] Cross Phys: [place] [address] : 1965 Age: 58 SEX:F [phone]  Exam Date:
23 Status: REG ER Acct: [id number] Loc: [facility] Pt Unit #: [id number] 
Report #: [id number] CC: Sandie Lucio MD PULSECHECKSJX:[id number] [facility] 
SERVICES REPORT Report Status: Signed Order # Category/Exam [id number] RAD/XR 
Abdomen 2 View/1 View Cxr ([id number]): . Results Exam: Single view of the chest
and 2 views of the abdomen HISTORY: Abdominal pain COMPARISON: None FINDINGS: 2 
views of the abdomen and asingle view the chest shows dilated small bowel loops 
throughout the abdomen with air-fluid levels on upright exam. Air is seen to the
level of the rectum. Surgical clips are seen throughout the abdomen and pelvis. 
The cardiomediastinal silhouette is normal in size. Scattered bilateral 
atelectasis and scarring are present. Calcifications are seen in the aorta. 
Osseous degenerative changes are noted. IMPRESSION: Dilated small bowel loops 
with air-fluid levels on upright exam representing small bowel obstruction 
versus ileus. Reported By: Edd Ritchie Electronically Signed: 7/3/2023 6:04 
PMReported By: Edd Ritchie DO Electronically Signed Date/Time: 23 Technologist: ABDIAZIZ Dictated Date/Time: 23 1803 Transcribed 
Date/Time:XR KUBSt. [facility] [address] [place] [address] [phone] Patient Name: Lilliana Saunders  
Medical Record#: [id number] Address: [address] [place] [address] Account#: 
[id number] City/State/Zip: [place] [address]  Attending Dr: Faraz CARLOS) Jose RUTHERFORD Phone: [phone] Insurance: [employer] BLUE ADVANTAGE EXC HANGE /Age/Sex: 
1965/58/F  Self Pay Admit/Reg Date: 23 Ordering Dr: Nicole Gomez MD Location: SJM5S/-I PCP: Segun Crawford NP Date of Service: 23 
Order (s): XR KUB CPT Code: 57871  Report Number: [id number] Reason for Exam:
sbo EXAM: Abdominal x-ray, 1 view Dictation location: E5 INDICATION: Small bowel
obstruction COMPARISON: Abdominal radiographs on 2023 DISCUSSION: A frontal
view of the abdomen is submitted.Several mildly dilated air-filled small bowel 
loops are noted. Bowel gas is present within the colon to the level of the 
rectum. No gross evidence for intra-abdominal free air is seen. Numerous abdomi
nal surgical clips are noted. No acutebony abnormalities are identified. 
IMPRESSION: Findings are consistent with ileus or partial small bowel 
obstruction. No gross evidence of intra-abdominal free air. Electronically 
signed by: Pernell Apodaca MD 2023 8:57 AM CDT Workstation: [id number] 
Dictated By: Pernell Apodaca MD 23 0643 Signed By: Pernell Apodaca MD 
23 0859 TD/TT: 643 Tech:  cc: [id number]; [id number]* Segun Crawford. Nl; CHELSI Santos. [facility] [address] [place] [address] [phone] Patient 
Name: Lilliana Saunders  Medical Record#: [id number] Address: [address] [place] [address]
134 Account#: [id number] City/State/Zip: [place] [address]  Attending Dr: Faraz CARLOS) Jose RUTHERFORD Phone: [phone] Insurance: [employer] BLUE ADVANTAGE EXC HANGE 
/Age/Sex: 1965/58/F  Self Pay Admit/Reg Date: 23 Ordering Dr: 
Nicole Gomez MD [facility] PCP: Segun Crawford NP Date of 
Service: 23 Order (s): XR KUB CPT Code: 05907  Report Number: [id number]
[id number] Reason for Exam: ileus vs SBO Abdomen (KUB) History: ileus vs SBO 
Comparison: ,  the same day Location: 5 Number of images: 2 
There are dilated air-filled loops of small bowel. The bones appear unchanged. 
No pathologic calcifications are identified.  IMPRESSION: The previously 
identified nasogastric tube is no longer demonstrated on the current images. 
There continue to be dilated air-filled loops of small bowel, which could be due
to a small bowel obstruction. Electronically signed by: William SCHAEFER 
2023 5:44 PM CDT Workstation: [id number] Dictated By: Jonnie Schultz MD 
23 Signed By: Jonnie Schultz MD 23 TD/TT: 23 
Tech: [id number] cc: [id number]; [id number]* Segun Crawford. Nl; Nicole Gomez MD
XR [address] [place] [address] [place] [address]  [phone] Patient Name: Lilliana Saunders Medical 
Record#: [id number] Address: [address] [place] [address] [address]  Account#: [id number] 
City/State/Zip: [place] [address] Attending Dr: Faraz CARLOS) Jose RUTHERFORD Phone: 
[phone]  Insurance: [employer] BLUE ADVANTAGE EXC HANGE /Age/Sex: 
1965/58/F Self Pay  Admit/RegDate: 23 Ordering Dr: Waldo Arciniega MD Location: SJM5S/-M PCP: Segun Crawford NP Date of Service: 23 
Order (s): XR KUB  CPT Code: 53093 Report Number: [id number] Reason for Exam:
NGT  Location: H3 KUB of the abdomen, 2023 CLINICAL HISTORY: Placement of 
nasogastric feeding tube COMPARISON EXAM: None relevant to this exam A 
nasogastric feeding tube is seen with the tip in the body of the stomach. The 
port of the nasogastric tube appears situated distal to the esophagogastric 
junction. There is no pneumoperitoneum. There is preferential small bowel 
obstruction and concern for a distal small bowelobstruction. Multiple surgical 
clips are identified. Electronically signed by: Anita Escudero MD 
2023 5:29 AM CDT Workstation: [id number] Dictated By: Anita Puckett MD 23 0518 Signed By: Anita Escudero MD 23 0531 
TD/TT: 2318 Tech:  cc: [id number]; [id number]* Segun Crawford. 
Nl; Waldo Arciniega MD